# Patient Record
Sex: FEMALE | Race: OTHER | HISPANIC OR LATINO | URBAN - METROPOLITAN AREA
[De-identification: names, ages, dates, MRNs, and addresses within clinical notes are randomized per-mention and may not be internally consistent; named-entity substitution may affect disease eponyms.]

---

## 2019-03-01 ENCOUNTER — OUTPATIENT (OUTPATIENT)
Dept: OUTPATIENT SERVICES | Facility: HOSPITAL | Age: 59
LOS: 1 days | End: 2019-03-01
Payer: COMMERCIAL

## 2019-03-23 ENCOUNTER — INPATIENT (INPATIENT)
Facility: HOSPITAL | Age: 59
LOS: 1 days | Discharge: HOME | End: 2019-03-25
Attending: INTERNAL MEDICINE | Admitting: INTERNAL MEDICINE
Payer: MEDICAID

## 2019-03-23 VITALS
HEART RATE: 65 BPM | SYSTOLIC BLOOD PRESSURE: 122 MMHG | TEMPERATURE: 98 F | DIASTOLIC BLOOD PRESSURE: 68 MMHG | RESPIRATION RATE: 16 BRPM | OXYGEN SATURATION: 98 %

## 2019-03-23 DIAGNOSIS — R07.9 CHEST PAIN, UNSPECIFIED: ICD-10-CM

## 2019-03-23 LAB
ALBUMIN SERPL ELPH-MCNC: 5 G/DL — SIGNIFICANT CHANGE UP (ref 3.3–5)
ALP SERPL-CCNC: 82 U/L — SIGNIFICANT CHANGE UP (ref 40–120)
ALT FLD-CCNC: 31 U/L — SIGNIFICANT CHANGE UP (ref 4–33)
ANION GAP SERPL CALC-SCNC: 19 MMO/L — HIGH (ref 7–14)
APTT BLD: 33.3 SEC — SIGNIFICANT CHANGE UP (ref 27.5–36.3)
AST SERPL-CCNC: 22 U/L — SIGNIFICANT CHANGE UP (ref 4–32)
BASOPHILS # BLD AUTO: 0.06 K/UL — SIGNIFICANT CHANGE UP (ref 0–0.2)
BASOPHILS NFR BLD AUTO: 0.4 % — SIGNIFICANT CHANGE UP (ref 0–2)
BILIRUB SERPL-MCNC: 0.4 MG/DL — SIGNIFICANT CHANGE UP (ref 0.2–1.2)
BUN SERPL-MCNC: 40 MG/DL — HIGH (ref 7–23)
CALCIUM SERPL-MCNC: 10.3 MG/DL — SIGNIFICANT CHANGE UP (ref 8.4–10.5)
CHLORIDE SERPL-SCNC: 95 MMOL/L — LOW (ref 98–107)
CO2 SERPL-SCNC: 22 MMOL/L — SIGNIFICANT CHANGE UP (ref 22–31)
CREAT SERPL-MCNC: 1.35 MG/DL — HIGH (ref 0.5–1.3)
EOSINOPHIL # BLD AUTO: 0.13 K/UL — SIGNIFICANT CHANGE UP (ref 0–0.5)
EOSINOPHIL NFR BLD AUTO: 0.9 % — SIGNIFICANT CHANGE UP (ref 0–6)
GLUCOSE SERPL-MCNC: 114 MG/DL — HIGH (ref 70–99)
HCT VFR BLD CALC: 44 % — SIGNIFICANT CHANGE UP (ref 34.5–45)
HGB BLD-MCNC: 14.6 G/DL — SIGNIFICANT CHANGE UP (ref 11.5–15.5)
IMM GRANULOCYTES NFR BLD AUTO: 0.4 % — SIGNIFICANT CHANGE UP (ref 0–1.5)
INR BLD: 1 — SIGNIFICANT CHANGE UP (ref 0.88–1.17)
LYMPHOCYTES # BLD AUTO: 23.2 % — SIGNIFICANT CHANGE UP (ref 13–44)
LYMPHOCYTES # BLD AUTO: 3.25 K/UL — SIGNIFICANT CHANGE UP (ref 1–3.3)
MCHC RBC-ENTMCNC: 29.9 PG — SIGNIFICANT CHANGE UP (ref 27–34)
MCHC RBC-ENTMCNC: 33.2 % — SIGNIFICANT CHANGE UP (ref 32–36)
MCV RBC AUTO: 90.2 FL — SIGNIFICANT CHANGE UP (ref 80–100)
MONOCYTES # BLD AUTO: 1.05 K/UL — HIGH (ref 0–0.9)
MONOCYTES NFR BLD AUTO: 7.5 % — SIGNIFICANT CHANGE UP (ref 2–14)
NEUTROPHILS # BLD AUTO: 9.44 K/UL — HIGH (ref 1.8–7.4)
NEUTROPHILS NFR BLD AUTO: 67.6 % — SIGNIFICANT CHANGE UP (ref 43–77)
NRBC # FLD: 0 K/UL — LOW (ref 25–125)
NT-PROBNP SERPL-SCNC: 20.84 PG/ML — SIGNIFICANT CHANGE UP
PLATELET # BLD AUTO: 398 K/UL — SIGNIFICANT CHANGE UP (ref 150–400)
PMV BLD: 10.3 FL — SIGNIFICANT CHANGE UP (ref 7–13)
POTASSIUM SERPL-MCNC: 3.9 MMOL/L — SIGNIFICANT CHANGE UP (ref 3.5–5.3)
POTASSIUM SERPL-SCNC: 3.9 MMOL/L — SIGNIFICANT CHANGE UP (ref 3.5–5.3)
PROT SERPL-MCNC: 8.6 G/DL — HIGH (ref 6–8.3)
PROTHROM AB SERPL-ACNC: 11.1 SEC — SIGNIFICANT CHANGE UP (ref 9.8–13.1)
RBC # BLD: 4.88 M/UL — SIGNIFICANT CHANGE UP (ref 3.8–5.2)
RBC # FLD: 12.4 % — SIGNIFICANT CHANGE UP (ref 10.3–14.5)
SODIUM SERPL-SCNC: 136 MMOL/L — SIGNIFICANT CHANGE UP (ref 135–145)
TROPONIN T, HIGH SENSITIVITY: 10 NG/L — SIGNIFICANT CHANGE UP (ref ?–14)
TROPONIN T, HIGH SENSITIVITY: 9 NG/L — SIGNIFICANT CHANGE UP (ref ?–14)
WBC # BLD: 13.98 K/UL — HIGH (ref 3.8–10.5)
WBC # FLD AUTO: 13.98 K/UL — HIGH (ref 3.8–10.5)

## 2019-03-23 PROCEDURE — 71045 X-RAY EXAM CHEST 1 VIEW: CPT | Mod: 26,59

## 2019-03-23 PROCEDURE — 71046 X-RAY EXAM CHEST 2 VIEWS: CPT | Mod: 26

## 2019-03-23 RX ORDER — SODIUM CHLORIDE 9 MG/ML
1000 INJECTION INTRAMUSCULAR; INTRAVENOUS; SUBCUTANEOUS ONCE
Qty: 0 | Refills: 0 | Status: COMPLETED | OUTPATIENT
Start: 2019-03-23 | End: 2019-03-23

## 2019-03-23 RX ORDER — ACETAMINOPHEN 500 MG
975 TABLET ORAL ONCE
Qty: 0 | Refills: 0 | Status: COMPLETED | OUTPATIENT
Start: 2019-03-23 | End: 2019-03-23

## 2019-03-23 RX ORDER — NITROGLYCERIN 6.5 MG
0.4 CAPSULE, EXTENDED RELEASE ORAL ONCE
Qty: 0 | Refills: 0 | Status: COMPLETED | OUTPATIENT
Start: 2019-03-23 | End: 2019-03-23

## 2019-03-23 RX ORDER — ASPIRIN/CALCIUM CARB/MAGNESIUM 324 MG
162 TABLET ORAL ONCE
Qty: 0 | Refills: 0 | Status: COMPLETED | OUTPATIENT
Start: 2019-03-23 | End: 2019-03-23

## 2019-03-23 RX ADMIN — SODIUM CHLORIDE 1000 MILLILITER(S): 9 INJECTION INTRAMUSCULAR; INTRAVENOUS; SUBCUTANEOUS at 18:54

## 2019-03-23 RX ADMIN — Medication 0.4 MILLIGRAM(S): at 19:17

## 2019-03-23 RX ADMIN — Medication 975 MILLIGRAM(S): at 17:59

## 2019-03-23 RX ADMIN — Medication 162 MILLIGRAM(S): at 17:58

## 2019-03-23 NOTE — ED ADULT NURSE NOTE - OBJECTIVE STATEMENT
Pt presenting to room 28 with c/o dizziness, SOB, CP, N/V. Pt states she has been having CP x5 months, denies any significant cardiac history. Pt states over the passed week CP has gotten worse, and has had SOB, N/V. Pt states SOB occurs at random times, both with sitting and ambulating, pt states she thinks it is related to anxiety. Pt presenting to room 28 with c/o dizziness, SOB, left sided CP, N/V. Pt states she has been having CP x5 months, denies any significant cardiac history. Pt states over the passed week CP has gotten worse, and has had SOB, N/V. Pt states SOB occurs at random times, both with sitting and ambulating, pt states she thinks it is related to anxiety. Pt denies any significant cardiac history, PMH of HTN. Pt ambulatory at baseline, motor strength equal Pt presenting to room 28 with c/o dizziness, SOB, left sided CP, N/V. Pt states she has been having CP x5 months, denies any significant cardiac history. Pt states over the passed week CP has gotten worse, and has had SOB, N/V. Pt states SOB occurs at random times, both with sitting and ambulating, pt states she thinks it is related to anxiety. Pt also states she feels as if the room is spinning when she is dizzy. Son at bedside states she has had multiple episodes of near syncope- she denies loosing consciousness or falling. Pt denies any significant cardiac history, PMH of HTN. Pt ambulatory at baseline. Pt denies urinary symptoms, confusion, loss of appetite, H/A, fever, chills. Abdomen soft and nondistended, skin dry and intact. IV established in RAC with 20g, labs drawn and sent, MD at bedside, will continue to monitor.

## 2019-03-23 NOTE — ED PROVIDER NOTE - OBJECTIVE STATEMENT
57 yo female with PMH of HTN, DM, presents to the ED for chest pain and near syncopal episode. Pt has had intermittent CP at rest for several weeks, worsening over past week and today noted to have near syncopal episode at home while watching TV on cough witnessed by family today. In ED, pt c/o active pressure like chest pain and back pain but denies ripping or tearing pain. Congruent BP's in b/l upper extremities, denies paresthesia, denies abd pain, n/v, diarrhea, fever, chills, trauma. Son who pt requested translate (declined translation services offered by ED), states they were all watching TV when she starting making an odd crying noise and slumped over but states she did not fully lose consciousness.

## 2019-03-23 NOTE — ED ADULT TRIAGE NOTE - CHIEF COMPLAINT QUOTE
patient c/o left chest and left arm pain x 5 months ,getting worse recently.  C/o feeling dizzy and was going to pass out today. FS = 153

## 2019-03-23 NOTE — ED PROVIDER NOTE - ATTENDING CONTRIBUTION TO CARE
Lujan: 58 yof with intermittent cp for few weeks, at initial eval by myself 9/10, earlier today had a near syncopal episode. PT states sxs started at rest, also with back apin and sob. Pain decreased significantly with ntg. On exam, BP 130s in both arms, clear lungs, cardiac normal, abd soft and non tender, no edema, neuro exam non focal. EKG bradycardia with no changes between 1st and 2nd, labs, CXR.

## 2019-03-24 DIAGNOSIS — E03.9 HYPOTHYROIDISM, UNSPECIFIED: ICD-10-CM

## 2019-03-24 DIAGNOSIS — Z29.9 ENCOUNTER FOR PROPHYLACTIC MEASURES, UNSPECIFIED: ICD-10-CM

## 2019-03-24 DIAGNOSIS — E11.65 TYPE 2 DIABETES MELLITUS WITH HYPERGLYCEMIA: ICD-10-CM

## 2019-03-24 DIAGNOSIS — N28.9 DISORDER OF KIDNEY AND URETER, UNSPECIFIED: ICD-10-CM

## 2019-03-24 DIAGNOSIS — I10 ESSENTIAL (PRIMARY) HYPERTENSION: ICD-10-CM

## 2019-03-24 DIAGNOSIS — I24.9 ACUTE ISCHEMIC HEART DISEASE, UNSPECIFIED: ICD-10-CM

## 2019-03-24 LAB
ANION GAP SERPL CALC-SCNC: 15 MMO/L — HIGH (ref 7–14)
BUN SERPL-MCNC: 28 MG/DL — HIGH (ref 7–23)
CALCIUM SERPL-MCNC: 9.5 MG/DL — SIGNIFICANT CHANGE UP (ref 8.4–10.5)
CHLORIDE SERPL-SCNC: 99 MMOL/L — SIGNIFICANT CHANGE UP (ref 98–107)
CHOLEST SERPL-MCNC: 178 MG/DL — SIGNIFICANT CHANGE UP (ref 120–199)
CO2 SERPL-SCNC: 25 MMOL/L — SIGNIFICANT CHANGE UP (ref 22–31)
CREAT SERPL-MCNC: 0.82 MG/DL — SIGNIFICANT CHANGE UP (ref 0.5–1.3)
GLUCOSE SERPL-MCNC: 151 MG/DL — HIGH (ref 70–99)
HBA1C BLD-MCNC: 7.8 % — HIGH (ref 4–5.6)
HCT VFR BLD CALC: 41.9 % — SIGNIFICANT CHANGE UP (ref 34.5–45)
HCV AB S/CO SERPL IA: 0.08 S/CO — SIGNIFICANT CHANGE UP (ref 0–0.79)
HCV AB SERPL-IMP: SIGNIFICANT CHANGE UP
HDLC SERPL-MCNC: 44 MG/DL — LOW (ref 45–65)
HGB BLD-MCNC: 13.8 G/DL — SIGNIFICANT CHANGE UP (ref 11.5–15.5)
LIPID PNL WITH DIRECT LDL SERPL: 110 MG/DL — SIGNIFICANT CHANGE UP
MAGNESIUM SERPL-MCNC: 2.4 MG/DL — SIGNIFICANT CHANGE UP (ref 1.6–2.6)
MCHC RBC-ENTMCNC: 29.9 PG — SIGNIFICANT CHANGE UP (ref 27–34)
MCHC RBC-ENTMCNC: 32.9 % — SIGNIFICANT CHANGE UP (ref 32–36)
MCV RBC AUTO: 90.9 FL — SIGNIFICANT CHANGE UP (ref 80–100)
NRBC # FLD: 0 K/UL — LOW (ref 25–125)
PHOSPHATE SERPL-MCNC: 4.4 MG/DL — SIGNIFICANT CHANGE UP (ref 2.5–4.5)
PLATELET # BLD AUTO: 322 K/UL — SIGNIFICANT CHANGE UP (ref 150–400)
PMV BLD: 10.2 FL — SIGNIFICANT CHANGE UP (ref 7–13)
POTASSIUM SERPL-MCNC: 3.8 MMOL/L — SIGNIFICANT CHANGE UP (ref 3.5–5.3)
POTASSIUM SERPL-SCNC: 3.8 MMOL/L — SIGNIFICANT CHANGE UP (ref 3.5–5.3)
RBC # BLD: 4.61 M/UL — SIGNIFICANT CHANGE UP (ref 3.8–5.2)
RBC # FLD: 12.6 % — SIGNIFICANT CHANGE UP (ref 10.3–14.5)
SODIUM SERPL-SCNC: 139 MMOL/L — SIGNIFICANT CHANGE UP (ref 135–145)
TRIGL SERPL-MCNC: 249 MG/DL — HIGH (ref 10–149)
TSH SERPL-MCNC: 0.84 UIU/ML — SIGNIFICANT CHANGE UP (ref 0.27–4.2)
WBC # BLD: 8.27 K/UL — SIGNIFICANT CHANGE UP (ref 3.8–10.5)
WBC # FLD AUTO: 8.27 K/UL — SIGNIFICANT CHANGE UP (ref 3.8–10.5)

## 2019-03-24 PROCEDURE — 93306 TTE W/DOPPLER COMPLETE: CPT | Mod: 26

## 2019-03-24 RX ORDER — DEXTROSE 50 % IN WATER 50 %
25 SYRINGE (ML) INTRAVENOUS ONCE
Qty: 0 | Refills: 0 | Status: DISCONTINUED | OUTPATIENT
Start: 2019-03-24 | End: 2019-03-25

## 2019-03-24 RX ORDER — GLUCAGON INJECTION, SOLUTION 0.5 MG/.1ML
1 INJECTION, SOLUTION SUBCUTANEOUS ONCE
Qty: 0 | Refills: 0 | Status: DISCONTINUED | OUTPATIENT
Start: 2019-03-24 | End: 2019-03-25

## 2019-03-24 RX ORDER — INSULIN LISPRO 100/ML
VIAL (ML) SUBCUTANEOUS
Qty: 0 | Refills: 0 | Status: DISCONTINUED | OUTPATIENT
Start: 2019-03-24 | End: 2019-03-25

## 2019-03-24 RX ORDER — ASPIRIN/CALCIUM CARB/MAGNESIUM 324 MG
81 TABLET ORAL DAILY
Qty: 0 | Refills: 0 | Status: DISCONTINUED | OUTPATIENT
Start: 2019-03-24 | End: 2019-03-25

## 2019-03-24 RX ORDER — DEXTROSE 50 % IN WATER 50 %
12.5 SYRINGE (ML) INTRAVENOUS ONCE
Qty: 0 | Refills: 0 | Status: DISCONTINUED | OUTPATIENT
Start: 2019-03-24 | End: 2019-03-25

## 2019-03-24 RX ORDER — PANTOPRAZOLE SODIUM 20 MG/1
40 TABLET, DELAYED RELEASE ORAL
Qty: 0 | Refills: 0 | Status: DISCONTINUED | OUTPATIENT
Start: 2019-03-24 | End: 2019-03-25

## 2019-03-24 RX ORDER — HEPARIN SODIUM 5000 [USP'U]/ML
5000 INJECTION INTRAVENOUS; SUBCUTANEOUS EVERY 12 HOURS
Qty: 0 | Refills: 0 | Status: DISCONTINUED | OUTPATIENT
Start: 2019-03-24 | End: 2019-03-25

## 2019-03-24 RX ORDER — LEVOTHYROXINE SODIUM 125 MCG
150 TABLET ORAL DAILY
Qty: 0 | Refills: 0 | Status: DISCONTINUED | OUTPATIENT
Start: 2019-03-24 | End: 2019-03-25

## 2019-03-24 RX ORDER — SODIUM CHLORIDE 9 MG/ML
1000 INJECTION, SOLUTION INTRAVENOUS
Qty: 0 | Refills: 0 | Status: DISCONTINUED | OUTPATIENT
Start: 2019-03-24 | End: 2019-03-25

## 2019-03-24 RX ORDER — INSULIN LISPRO 100/ML
VIAL (ML) SUBCUTANEOUS AT BEDTIME
Qty: 0 | Refills: 0 | Status: DISCONTINUED | OUTPATIENT
Start: 2019-03-24 | End: 2019-03-25

## 2019-03-24 RX ORDER — DEXTROSE 50 % IN WATER 50 %
15 SYRINGE (ML) INTRAVENOUS ONCE
Qty: 0 | Refills: 0 | Status: DISCONTINUED | OUTPATIENT
Start: 2019-03-24 | End: 2019-03-25

## 2019-03-24 RX ADMIN — PANTOPRAZOLE SODIUM 40 MILLIGRAM(S): 20 TABLET, DELAYED RELEASE ORAL at 05:12

## 2019-03-24 RX ADMIN — Medication 150 MICROGRAM(S): at 05:12

## 2019-03-24 RX ADMIN — HEPARIN SODIUM 5000 UNIT(S): 5000 INJECTION INTRAVENOUS; SUBCUTANEOUS at 05:12

## 2019-03-24 RX ADMIN — Medication 81 MILLIGRAM(S): at 11:08

## 2019-03-24 RX ADMIN — HEPARIN SODIUM 5000 UNIT(S): 5000 INJECTION INTRAVENOUS; SUBCUTANEOUS at 17:16

## 2019-03-24 NOTE — H&P ADULT - PROBLEM SELECTOR PLAN 2
BUN/Cr: 40/1.35 likely 2/2 to underline DM vs HTN  Will monitor for now   Renal diet ordered   Avoid nephrotoxic medications

## 2019-03-24 NOTE — H&P ADULT - HISTORY OF PRESENT ILLNESS
57 y/o F with PMH of DM, HTN, Hypothyroidism presented with the complaint of chest pain and near syncopal episode. As per daughter in law who is it at bedside patient has been having intermittent midsternal chest pain for the past 5 months but worsened the past few days. Patient would describe the chest pain as a sharp pain, without any aggravating or alleviating factors(present both at rest and with exertion), with radiation of the chest pain to the left arm and left side of the neck, with a severity of 9/10. Patient also endorsed of SOB and ELIZABETH with the chest pain with associated diaphoresis. Son stated that his mother had nausea with the chest pain with one episode of NBNB vomiting prior to coming to the ER. Son stated that today after the chest pain she developed dizziness and lightheadedness and felt like she was about to pass out.Son denied any LOC, head trauma or any seizure like activities. Son denied the following for his mother: Fevers, chills, D/C, abdominal pain, dysuria, melena, hematochezia, recent travel, sick contact, pleuritic or positional chest pain.     On ED admission EKG revealed Sinus bradycardia at a rate of 50 with 1mm STD in leads V2-V4 and QTc of 432, CE x 1: Trop: 10, CE x 2: Trop: 9, WBC: 13.98, BUN/Cr: 40/1.35, Gluc: 114, Protein: 8.6, BNP: 20.84. Prelim CXR: No emergent findings. In the ED patient received Aspirin 162mg and Nitroglycerine 0.4mg. When examined patient is resting in the stretcher and denied any current chest pain or SOB.

## 2019-03-24 NOTE — H&P ADULT - GASTROINTESTINAL DETAILS
normal/no guarding/nontender/no distention/bowel sounds normal/soft/no rebound tenderness/no rigidity

## 2019-03-24 NOTE — H&P ADULT - RS GEN PE MLT RESP DETAILS PC
no rhonchi/no intercostal retractions/no chest wall tenderness/respirations non-labored/airway patent/rales/normal/no wheezes

## 2019-03-24 NOTE — H&P ADULT - NEGATIVE OPHTHALMOLOGIC SYMPTOMS
no blurred vision R/no discharge R/no blurred vision L/no lacrimation R/no photophobia/no diplopia/no lacrimation L/no discharge L

## 2019-03-24 NOTE — H&P ADULT - ASSESSMENT
57 y/o F with PMH of DM, HTN, Hypothyroidism presented with the complaint of chest pain and near syncopal episode. R/o ACS

## 2019-03-24 NOTE — H&P ADULT - NEGATIVE NEUROLOGICAL SYMPTOMS
no syncope/no headache/no transient paralysis/no loss of consciousness/no hemiparesis/no confusion/no focal seizures/no paresthesias/no generalized seizures/no vertigo/no loss of sensation/no difficulty walking/no tremors

## 2019-03-24 NOTE — H&P ADULT - NEGATIVE MUSCULOSKELETAL SYMPTOMS
no arthralgia/no stiffness/no neck pain/no myalgia/no arthritis/no muscle cramps/no muscle weakness/no joint swelling

## 2019-03-24 NOTE — H&P ADULT - PROBLEM SELECTOR PLAN 4
Will hold home losartan and chlorthalidone due to elevated creatinine  Will monitor blood pressure with vital signs q4hrs   DASH diet recommended

## 2019-03-24 NOTE — H&P ADULT - NSHPLABSRESULTS_GEN_ALL_CORE
14.6   13.98 )-----------( 398      ( 23 Mar 2019 17:50 )             44.0     03-23    136  |  95<L>  |  40<H>  ----------------------------<  114<H>  3.9   |  22  |  1.35<H>    Ca    10.3      23 Mar 2019 17:50    TPro  8.6<H>  /  Alb  5.0  /  TBili  0.4  /  DBili  x   /  AST  22  /  ALT  31  /  AlkPhos  82  03-23    Prelim CXR: No emergent findings    EKG: Sinus bradycardia at a rate of 50 with 1mm STD in leads V2-V4 and QTc of 432

## 2019-03-24 NOTE — H&P ADULT - PROBLEM SELECTOR PLAN 1
Will monitor on telemetry, serial EKG and Mindy prn for any more episodes of chest pain   HgbA1C, TSH, lipid profile, CBC, CMP in am   Consider ischemic workup with NST this admission. Patient noted to be bradycardic will check for chronotropic competence  TTE ordered   Started on Aspirin 81mg daily

## 2019-03-25 ENCOUNTER — TRANSCRIPTION ENCOUNTER (OUTPATIENT)
Age: 59
End: 2019-03-25

## 2019-03-25 VITALS — SYSTOLIC BLOOD PRESSURE: 146 MMHG | HEART RATE: 64 BPM | DIASTOLIC BLOOD PRESSURE: 78 MMHG

## 2019-03-25 LAB
ANION GAP SERPL CALC-SCNC: 13 MMO/L — SIGNIFICANT CHANGE UP (ref 7–14)
BUN SERPL-MCNC: 25 MG/DL — HIGH (ref 7–23)
CALCIUM SERPL-MCNC: 9.8 MG/DL — SIGNIFICANT CHANGE UP (ref 8.4–10.5)
CHLORIDE SERPL-SCNC: 98 MMOL/L — SIGNIFICANT CHANGE UP (ref 98–107)
CO2 SERPL-SCNC: 27 MMOL/L — SIGNIFICANT CHANGE UP (ref 22–31)
CREAT SERPL-MCNC: 0.72 MG/DL — SIGNIFICANT CHANGE UP (ref 0.5–1.3)
GLUCOSE SERPL-MCNC: 173 MG/DL — HIGH (ref 70–99)
HCT VFR BLD CALC: 44.7 % — SIGNIFICANT CHANGE UP (ref 34.5–45)
HGB BLD-MCNC: 14.3 G/DL — SIGNIFICANT CHANGE UP (ref 11.5–15.5)
MCHC RBC-ENTMCNC: 29.9 PG — SIGNIFICANT CHANGE UP (ref 27–34)
MCHC RBC-ENTMCNC: 32 % — SIGNIFICANT CHANGE UP (ref 32–36)
MCV RBC AUTO: 93.5 FL — SIGNIFICANT CHANGE UP (ref 80–100)
NRBC # FLD: 0 K/UL — LOW (ref 25–125)
PLATELET # BLD AUTO: 334 K/UL — SIGNIFICANT CHANGE UP (ref 150–400)
PMV BLD: 10.3 FL — SIGNIFICANT CHANGE UP (ref 7–13)
POTASSIUM SERPL-MCNC: 3.4 MMOL/L — LOW (ref 3.5–5.3)
POTASSIUM SERPL-SCNC: 3.4 MMOL/L — LOW (ref 3.5–5.3)
RBC # BLD: 4.78 M/UL — SIGNIFICANT CHANGE UP (ref 3.8–5.2)
RBC # FLD: 12.2 % — SIGNIFICANT CHANGE UP (ref 10.3–14.5)
SODIUM SERPL-SCNC: 138 MMOL/L — SIGNIFICANT CHANGE UP (ref 135–145)
WBC # BLD: 6.39 K/UL — SIGNIFICANT CHANGE UP (ref 3.8–10.5)
WBC # FLD AUTO: 6.39 K/UL — SIGNIFICANT CHANGE UP (ref 3.8–10.5)

## 2019-03-25 PROCEDURE — 78452 HT MUSCLE IMAGE SPECT MULT: CPT | Mod: 26

## 2019-03-25 PROCEDURE — 93016 CV STRESS TEST SUPVJ ONLY: CPT | Mod: GC

## 2019-03-25 PROCEDURE — 93018 CV STRESS TEST I&R ONLY: CPT | Mod: GC

## 2019-03-25 RX ORDER — CHLORTHALIDONE 50 MG
1 TABLET ORAL
Qty: 0 | Refills: 0 | COMMUNITY

## 2019-03-25 RX ORDER — ASPIRIN/CALCIUM CARB/MAGNESIUM 324 MG
1 TABLET ORAL
Qty: 0 | Refills: 0 | COMMUNITY
Start: 2019-03-25

## 2019-03-25 RX ORDER — METFORMIN HYDROCHLORIDE 850 MG/1
1 TABLET ORAL
Qty: 0 | Refills: 0 | COMMUNITY

## 2019-03-25 RX ORDER — OMEPRAZOLE 10 MG/1
1 CAPSULE, DELAYED RELEASE ORAL
Qty: 0 | Refills: 0 | COMMUNITY

## 2019-03-25 RX ORDER — LOSARTAN POTASSIUM 100 MG/1
1 TABLET, FILM COATED ORAL
Qty: 0 | Refills: 0 | COMMUNITY

## 2019-03-25 RX ORDER — LEVOTHYROXINE SODIUM 125 MCG
1 TABLET ORAL
Qty: 0 | Refills: 0 | COMMUNITY

## 2019-03-25 RX ORDER — POTASSIUM CHLORIDE 20 MEQ
40 PACKET (EA) ORAL ONCE
Qty: 0 | Refills: 0 | Status: COMPLETED | OUTPATIENT
Start: 2019-03-25 | End: 2019-03-25

## 2019-03-25 RX ADMIN — Medication 40 MILLIEQUIVALENT(S): at 11:47

## 2019-03-25 RX ADMIN — HEPARIN SODIUM 5000 UNIT(S): 5000 INJECTION INTRAVENOUS; SUBCUTANEOUS at 06:39

## 2019-03-25 RX ADMIN — Medication 81 MILLIGRAM(S): at 11:47

## 2019-03-25 RX ADMIN — Medication 150 MICROGRAM(S): at 06:39

## 2019-03-25 RX ADMIN — PANTOPRAZOLE SODIUM 40 MILLIGRAM(S): 20 TABLET, DELAYED RELEASE ORAL at 06:38

## 2019-03-25 NOTE — DISCHARGE NOTE PROVIDER - HOSPITAL COURSE
59 y/o F with PMH of DM, HTN, Hypothyroidism presented with the complaint of chest pain and near syncopal episode. As per daughter in law who is it at bedside patient has been having intermittent midsternal chest pain for the past 5 months but worsened the past few days. Patient would describe the chest pain as a sharp pain, without any aggravating or alleviating factors(present both at rest and with exertion), with radiation of the chest pain to the left arm and left side of the neck, with a severity of 9/10. Patient also endorsed of SOB and ELIZABETH with the chest pain with associated diaphoresis. Son stated that his mother had nausea with the chest pain with one episode of NBNB vomiting prior to coming to the ER. Son stated that today after the chest pain she developed dizziness and lightheadedness and felt like she was about to pass out.Son denied any LOC, head trauma or any seizure like activities. Son denied the following for his mother: Fevers, chills, D/C, abdominal pain, dysuria, melena, hematochezia, recent travel, sick contact, pleuritic or positional chest pain.         On ED admission EKG revealed Sinus bradycardia at a rate of 50 with 1mm STD in leads V2-V4 and QTc of 432, CE x 1: Trop: 10, CE x 2: Trop: 9, WBC: 13.98, BUN/Cr: 40/1.35, Gluc: 114, Protein: 8.6, BNP: 20.84. Prelim CXR: No emergent findings. In the ED patient received Aspirin 162mg and Nitroglycerine 0.4mg. When examined patient is resting in the stretcher and denied any current chest pain or SOB. 57 y/o F with PMH of DM, HTN, Hypothyroidism presented with the complaint of chest pain and near syncopal episode. As per daughter in law who is it at bedside patient has been having intermittent midsternal chest pain for the past 5 months but worsened the past few days. Patient would describe the chest pain as a sharp pain, without any aggravating or alleviating factors(present both at rest and with exertion), with radiation of the chest pain to the left arm and left side of the neck, with a severity of 9/10. Patient also endorsed of SOB and ELIZABETH with the chest pain with associated diaphoresis. Son stated that his mother had nausea with the chest pain with one episode of NBNB vomiting prior to coming to the ER. Son stated that today after the chest pain she developed dizziness and lightheadedness and felt like she was about to pass out.Son denied any LOC, head trauma or any seizure like activities. Son denied the following for his mother: Fevers, chills, D/C, abdominal pain, dysuria, melena, hematochezia, recent travel, sick contact, pleuritic or positional chest pain.         On ED admission EKG revealed Sinus bradycardia at a rate of 50 with 1mm STD in leads V2-V4 and QTc of 432, CE x 1: Trop: 10, CE x 2: Trop: 9, WBC: 13.98, BUN/Cr: 40/1.35, Gluc: 114, Protein: 8.6, BNP: 20.84. CXR: Minimal left lung base linear or subsegmental atelectasis seen on the lateral x-ray. There is no evidence of pleural effusion or pneumothorax. The cardiomediastinal silhouette is unremarkable. The visualized osseous and soft tissue structures demonstrate no acute pathology. In the ED patient received Aspirin 162mg and Nitroglycerine 0.4mg. When examined patient is resting in the stretcher and denied any current chest pain or SOB.         NST Pharmacologic: Normal Study    * Myocardial Perfusion SPECT results are normal.    * Review of raw data shows: The study is of good technical quality, despite overlying breast attenuation.     * The left ventricle was small in size. Normal myocardial perfusion scan,with no evidence of infarction or inducible ischemia.    * Post-stress gated wall motion analysis was performed (LVEF > 70%;LVEDV = 52 ml.), revealing normal LV function. RV function appeared normal.        Cardiology attending: Patient has ruled out for ACS. EKG is sinus bradycardia with non-specific t-wave changes. Given risk factors and abnormal EKG will plan for exercise NST. TTE with no significant structural heart disease. Discharge planning pending NST.     Case reviewed with attending who cleared for discharge. 59 y/o F with PMH of DM, HTN, Hypothyroidism presented with the complaint of chest pain and near syncopal episode. As per daughter in law who is it at bedside patient has been having intermittent midsternal chest pain for the past 5 months but worsened the past few days. Patient would describe the chest pain as a sharp pain, without any aggravating or alleviating factors(present both at rest and with exertion), with radiation of the chest pain to the left arm and left side of the neck, with a severity of 9/10. Patient also endorsed of SOB and ELIZABETH with the chest pain with associated diaphoresis. Son stated that his mother had nausea with the chest pain with one episode of NBNB vomiting prior to coming to the ER. Son stated that today after the chest pain she developed dizziness and lightheadedness and felt like she was about to pass out.Son denied any LOC, head trauma or any seizure like activities. Son denied the following for his mother: Fevers, chills, D/C, abdominal pain, dysuria, melena, hematochezia, recent travel, sick contact, pleuritic or positional chest pain.         On ED admission EKG revealed Sinus bradycardia at a rate of 50 with 1mm STD in leads V2-V4 and QTc of 432, CE x 1: Trop: 10, CE x 2: Trop: 9, WBC: 13.98, BUN/Cr: 40/1.35, Gluc: 114, Protein: 8.6, BNP: 20.84. CXR: Minimal left lung base linear or subsegmental atelectasis seen on the lateral x-ray. There is no evidence of pleural effusion or pneumothorax. The cardiomediastinal silhouette is unremarkable. The visualized osseous and soft tissue structures demonstrate no acute pathology. In the ED patient received Aspirin 162mg and Nitroglycerine 0.4mg. When examined patient is resting in the stretcher and denied any current chest pain or SOB.         NST Pharmacologic: Normal Study    * Myocardial Perfusion SPECT results are normal.    * Review of raw data shows: The study is of good technical quality, despite overlying breast attenuation.     * The left ventricle was small in size. Normal myocardial perfusion scan,with no evidence of infarction or inducible ischemia.    * Post-stress gated wall motion analysis was performed (LVEF > 70%;LVEDV = 52 ml.), revealing normal LV function. RV function appeared normal.        Cardiology attending: Patient has ruled out for ACS. EKG is sinus bradycardia with non-specific t-wave changes. Given risk factors and abnormal EKG will plan for exercise NST. TTE with no significant structural heart disease. Discharge planning pending NST.     Case reviewed with attending who cleared for discharge after negative stress test.

## 2019-03-25 NOTE — DISCHARGE NOTE NURSING/CASE MANAGEMENT/SOCIAL WORK - NSDCDPATPORTLINK_GEN_ALL_CORE
You can access the [facility], offered by [facility], by registering with the following website: [email or web address]

## 2019-03-25 NOTE — DISCHARGE NOTE PROVIDER - CARE PROVIDER_API CALL
Eric Dee)  Internal Medicine  [address] [place] [address]  Phone: [phone]  Fax: [phone]  Follow Up Time: 1 week

## 2019-03-25 NOTE — PROGRESS NOTE ADULT - SUBJECTIVE AND OBJECTIVE BOX
Cardiovascular Disease Progress Note    Overnight events: No acute events overnight. Ms. Hull denies chest pain or SOB.    Otherwise review of systems negative    Objective Findings:  T(C): 36.7 (03-25-19 @ 05:35), Max: 37.2 (03-24-19 @ 10:52)  HR: 62 (03-25-19 @ 05:35) (60 - 65)  BP: 121/69 (03-25-19 @ 05:35) (121/69 - 139/81)  RR: 18 (03-25-19 @ 05:35) (17 - 18)  SpO2: 99% (03-25-19 @ 05:35) (95% - 100%)  Wt(kg): --  Daily     Daily       Physical Exam:  Gen: NAD  HEENT: EOMI  CV: RRR, normal S1 + S2, no m/r/g  Lungs: CTAB  Abd: soft, non-tender  Ext: No edema    Telemetry: Sinus    Laboratory Data:                        13.8   8.27  )-----------( 322      ( 24 Mar 2019 06:33 )             41.9     03-25    138  |  98  |  25<H>  ----------------------------<  173<H>  3.4<L>   |  27  |  0.72    Ca    9.8      25 Mar 2019 07:10  Phos  4.4     03-24  Mg     2.4     03-24    TPro  8.6<H>  /  Alb  5.0  /  TBili  0.4  /  DBili  x   /  AST  22  /  ALT  31  /  AlkPhos  82  03-23    PT/INR - ( 23 Mar 2019 17:50 )   PT: 11.1 SEC;   INR: 1.00          PTT - ( 23 Mar 2019 17:50 )  PTT:33.3 SEC          Inpatient Medications:  MEDICATIONS  (STANDING):  aspirin enteric coated 81 milliGRAM(s) Oral daily  dextrose 5%. 1000 milliLiter(s) (50 mL/Hr) IV Continuous <Continuous>  dextrose 50% Injectable 12.5 Gram(s) IV Push once  dextrose 50% Injectable 25 Gram(s) IV Push once  dextrose 50% Injectable 25 Gram(s) IV Push once  heparin  Injectable 5000 Unit(s) SubCutaneous every 12 hours  insulin lispro (HumaLOG) corrective regimen sliding scale   SubCutaneous three times a day before meals  insulin lispro (HumaLOG) corrective regimen sliding scale   SubCutaneous at bedtime  levothyroxine 150 MICROGram(s) Oral daily  pantoprazole    Tablet 40 milliGRAM(s) Oral before breakfast      Assessment: 58 year old woman with HTN and uncontrolled NIDDM (HbA1c -7.8%) presents with angina and abnormal EKG.    #Angina-  Patient has ruled out for ACS.  EKG is sinus bradycardia with non-specific t-wave changes.  Given risk factors and abnormal EKG will plan for TTE and exercise NST.    #HTN-  BP acceptable at present and orthostatics negative.  Please resume home dose BP meds.      Discharge planning pending TTE and NST.    Over 25 minutes spent on total encounter; more than 50% of the visit was spent counseling and/or coordinating care by the attending physician.      Eric Dee MD [facility]  Cardiovascular Disease  [phone] Cardiovascular Disease Progress Note    Overnight events: No acute events overnight. Ms. Hull denies chest pain or SOB.    Otherwise review of systems negative    Objective Findings:  T(C): 36.7 (03-25-19 @ 05:35), Max: 37.2 (03-24-19 @ 10:52)  HR: 62 (03-25-19 @ 05:35) (60 - 65)  BP: 121/69 (03-25-19 @ 05:35) (121/69 - 139/81)  RR: 18 (03-25-19 @ 05:35) (17 - 18)  SpO2: 99% (03-25-19 @ 05:35) (95% - 100%)  Wt(kg): --  Daily     Daily       Physical Exam:  Gen: NAD  HEENT: EOMI  CV: RRR, normal S1 + S2, no m/r/g  Lungs: CTAB  Abd: soft, non-tender  Ext: No edema    Telemetry: Sinus    Laboratory Data:                        13.8   8.27  )-----------( 322      ( 24 Mar 2019 06:33 )             41.9     03-25    138  |  98  |  25<H>  ----------------------------<  173<H>  3.4<L>   |  27  |  0.72    Ca    9.8      25 Mar 2019 07:10  Phos  4.4     03-24  Mg     2.4     03-24    TPro  8.6<H>  /  Alb  5.0  /  TBili  0.4  /  DBili  x   /  AST  22  /  ALT  31  /  AlkPhos  82  03-23    PT/INR - ( 23 Mar 2019 17:50 )   PT: 11.1 SEC;   INR: 1.00          PTT - ( 23 Mar 2019 17:50 )  PTT:33.3 SEC          Inpatient Medications:  MEDICATIONS  (STANDING):  aspirin enteric coated 81 milliGRAM(s) Oral daily  dextrose 5%. 1000 milliLiter(s) (50 mL/Hr) IV Continuous <Continuous>  dextrose 50% Injectable 12.5 Gram(s) IV Push once  dextrose 50% Injectable 25 Gram(s) IV Push once  dextrose 50% Injectable 25 Gram(s) IV Push once  heparin  Injectable 5000 Unit(s) SubCutaneous every 12 hours  insulin lispro (HumaLOG) corrective regimen sliding scale   SubCutaneous three times a day before meals  insulin lispro (HumaLOG) corrective regimen sliding scale   SubCutaneous at bedtime  levothyroxine 150 MICROGram(s) Oral daily  pantoprazole    Tablet 40 milliGRAM(s) Oral before breakfast      Assessment: 58 year old woman with HTN and uncontrolled NIDDM (HbA1c -7.8%) presents with angina and abnormal EKG.    #Angina-  Patient has ruled out for ACS.  EKG is sinus bradycardia with non-specific t-wave changes.  Given risk factors and abnormal EKG will plan for exercise NST.  TTE with no significant structural heart disease.     #HTN-  BP acceptable at present and orthostatics negative.  Please resume home dose BP meds.      Discharge planning pending NST.    Over 25 minutes spent on total encounter; more than 50% of the visit was spent counseling and/or coordinating care by the attending physician.      Eric Dee MD [facility]  Cardiovascular Disease  [phone]

## 2019-03-25 NOTE — DISCHARGE NOTE PROVIDER - NSDCCPCAREPLAN_GEN_ALL_CORE_FT
PRINCIPAL DISCHARGE DIAGNOSIS  Diagnosis: Chest pain  Assessment and Plan of Treatment: You presented to the hospital for chest pain and a near syncopal episode. No cardiac etiology for chest pain was found. Echocardiagram was normal and stress test was also found to be within normal limits. You are advised to follow up outpatient with your cardiologist in 1-2 weeks for outpatient follow up.

## 2020-06-15 PROCEDURE — G9001: CPT

## 2020-07-01 ENCOUNTER — EMERGENCY (EMERGENCY)
Facility: HOSPITAL | Age: 60
LOS: 1 days | Discharge: HOME | End: 2020-07-01
Attending: EMERGENCY MEDICINE | Admitting: EMERGENCY MEDICINE
Payer: MEDICAID

## 2020-07-01 VITALS
SYSTOLIC BLOOD PRESSURE: 157 MMHG | RESPIRATION RATE: 18 BRPM | TEMPERATURE: 98 F | OXYGEN SATURATION: 100 % | DIASTOLIC BLOOD PRESSURE: 73 MMHG | HEART RATE: 61 BPM

## 2020-07-01 VITALS
OXYGEN SATURATION: 97 % | DIASTOLIC BLOOD PRESSURE: 84 MMHG | RESPIRATION RATE: 15 BRPM | TEMPERATURE: 98 F | HEART RATE: 64 BPM | SYSTOLIC BLOOD PRESSURE: 200 MMHG

## 2020-07-01 PROBLEM — E03.9 HYPOTHYROIDISM, UNSPECIFIED: Chronic | Status: ACTIVE | Noted: 2019-03-24

## 2020-07-01 PROBLEM — I10 ESSENTIAL (PRIMARY) HYPERTENSION: Chronic | Status: ACTIVE | Noted: 2019-03-24

## 2020-07-01 PROBLEM — E11.9 TYPE 2 DIABETES MELLITUS WITHOUT COMPLICATIONS: Chronic | Status: ACTIVE | Noted: 2019-03-23

## 2020-07-01 LAB
ALBUMIN SERPL ELPH-MCNC: 5 G/DL — SIGNIFICANT CHANGE UP (ref 3.3–5)
ALP SERPL-CCNC: 72 U/L — SIGNIFICANT CHANGE UP (ref 40–120)
ALT FLD-CCNC: 30 U/L — SIGNIFICANT CHANGE UP (ref 4–33)
ANION GAP SERPL CALC-SCNC: 14 MMO/L — SIGNIFICANT CHANGE UP (ref 7–14)
APPEARANCE UR: CLEAR — SIGNIFICANT CHANGE UP
AST SERPL-CCNC: 28 U/L — SIGNIFICANT CHANGE UP (ref 4–32)
BASOPHILS # BLD AUTO: 0.03 K/UL — SIGNIFICANT CHANGE UP (ref 0–0.2)
BASOPHILS NFR BLD AUTO: 0.3 % — SIGNIFICANT CHANGE UP (ref 0–2)
BILIRUB SERPL-MCNC: 0.5 MG/DL — SIGNIFICANT CHANGE UP (ref 0.2–1.2)
BILIRUB UR-MCNC: NEGATIVE — SIGNIFICANT CHANGE UP
BLOOD UR QL VISUAL: SIGNIFICANT CHANGE UP
BUN SERPL-MCNC: 11 MG/DL — SIGNIFICANT CHANGE UP (ref 7–23)
CALCIUM SERPL-MCNC: 10.1 MG/DL — SIGNIFICANT CHANGE UP (ref 8.4–10.5)
CHLORIDE SERPL-SCNC: 100 MMOL/L — SIGNIFICANT CHANGE UP (ref 98–107)
CO2 SERPL-SCNC: 27 MMOL/L — SIGNIFICANT CHANGE UP (ref 22–31)
COLOR SPEC: COLORLESS — SIGNIFICANT CHANGE UP
CREAT SERPL-MCNC: 0.57 MG/DL — SIGNIFICANT CHANGE UP (ref 0.5–1.3)
EOSINOPHIL # BLD AUTO: 0.15 K/UL — SIGNIFICANT CHANGE UP (ref 0–0.5)
EOSINOPHIL NFR BLD AUTO: 1.7 % — SIGNIFICANT CHANGE UP (ref 0–6)
GLUCOSE SERPL-MCNC: 179 MG/DL — HIGH (ref 70–99)
GLUCOSE UR-MCNC: NEGATIVE — SIGNIFICANT CHANGE UP
HCG SERPL-ACNC: < 5 MIU/ML — SIGNIFICANT CHANGE UP
HCT VFR BLD CALC: 42.2 % — SIGNIFICANT CHANGE UP (ref 34.5–45)
HGB BLD-MCNC: 13.8 G/DL — SIGNIFICANT CHANGE UP (ref 11.5–15.5)
IMM GRANULOCYTES NFR BLD AUTO: 0.2 % — SIGNIFICANT CHANGE UP (ref 0–1.5)
KETONES UR-MCNC: NEGATIVE — SIGNIFICANT CHANGE UP
LEUKOCYTE ESTERASE UR-ACNC: NEGATIVE — SIGNIFICANT CHANGE UP
LIDOCAIN IGE QN: 45.9 U/L — SIGNIFICANT CHANGE UP (ref 7–60)
LYMPHOCYTES # BLD AUTO: 2.84 K/UL — SIGNIFICANT CHANGE UP (ref 1–3.3)
LYMPHOCYTES # BLD AUTO: 33 % — SIGNIFICANT CHANGE UP (ref 13–44)
MCHC RBC-ENTMCNC: 29.5 PG — SIGNIFICANT CHANGE UP (ref 27–34)
MCHC RBC-ENTMCNC: 32.7 % — SIGNIFICANT CHANGE UP (ref 32–36)
MCV RBC AUTO: 90.2 FL — SIGNIFICANT CHANGE UP (ref 80–100)
MONOCYTES # BLD AUTO: 0.57 K/UL — SIGNIFICANT CHANGE UP (ref 0–0.9)
MONOCYTES NFR BLD AUTO: 6.6 % — SIGNIFICANT CHANGE UP (ref 2–14)
NEUTROPHILS # BLD AUTO: 5 K/UL — SIGNIFICANT CHANGE UP (ref 1.8–7.4)
NEUTROPHILS NFR BLD AUTO: 58.2 % — SIGNIFICANT CHANGE UP (ref 43–77)
NITRITE UR-MCNC: NEGATIVE — SIGNIFICANT CHANGE UP
NRBC # FLD: 0 K/UL — SIGNIFICANT CHANGE UP (ref 0–0)
PH UR: 8.5 — HIGH (ref 5–8)
PLATELET # BLD AUTO: 307 K/UL — SIGNIFICANT CHANGE UP (ref 150–400)
PMV BLD: 10.6 FL — SIGNIFICANT CHANGE UP (ref 7–13)
POTASSIUM SERPL-MCNC: 4.1 MMOL/L — SIGNIFICANT CHANGE UP (ref 3.5–5.3)
POTASSIUM SERPL-SCNC: 4.1 MMOL/L — SIGNIFICANT CHANGE UP (ref 3.5–5.3)
PROT SERPL-MCNC: 8.2 G/DL — SIGNIFICANT CHANGE UP (ref 6–8.3)
PROT UR-MCNC: NEGATIVE — SIGNIFICANT CHANGE UP
RBC # BLD: 4.68 M/UL — SIGNIFICANT CHANGE UP (ref 3.8–5.2)
RBC # FLD: 12.8 % — SIGNIFICANT CHANGE UP (ref 10.3–14.5)
SODIUM SERPL-SCNC: 141 MMOL/L — SIGNIFICANT CHANGE UP (ref 135–145)
SP GR SPEC: 1.01 — SIGNIFICANT CHANGE UP (ref 1–1.04)
TROPONIN T, HIGH SENSITIVITY: < 6 NG/L — SIGNIFICANT CHANGE UP (ref ?–14)
UROBILINOGEN FLD QL: NORMAL — SIGNIFICANT CHANGE UP
WBC # BLD: 8.61 K/UL — SIGNIFICANT CHANGE UP (ref 3.8–10.5)
WBC # FLD AUTO: 8.61 K/UL — SIGNIFICANT CHANGE UP (ref 3.8–10.5)

## 2020-07-01 PROCEDURE — 93010 ELECTROCARDIOGRAM REPORT: CPT

## 2020-07-01 PROCEDURE — 99284 EMERGENCY DEPT VISIT MOD MDM: CPT | Mod: 25

## 2020-07-01 PROCEDURE — 74177 CT ABD & PELVIS W/CONTRAST: CPT | Mod: 26

## 2020-07-01 RX ORDER — SODIUM CHLORIDE 9 MG/ML
1000 INJECTION INTRAMUSCULAR; INTRAVENOUS; SUBCUTANEOUS ONCE
Refills: 0 | Status: COMPLETED | OUTPATIENT
Start: 2020-07-01 | End: 2020-07-01

## 2020-07-01 RX ORDER — LISINOPRIL 2.5 MG/1
10 TABLET ORAL DAILY
Refills: 0 | Status: DISCONTINUED | OUTPATIENT
Start: 2020-07-01 | End: 2020-07-05

## 2020-07-01 RX ORDER — FAMOTIDINE 10 MG/ML
20 INJECTION INTRAVENOUS ONCE
Refills: 0 | Status: COMPLETED | OUTPATIENT
Start: 2020-07-01 | End: 2020-07-01

## 2020-07-01 RX ORDER — FAMOTIDINE 10 MG/ML
1 INJECTION INTRAVENOUS
Qty: 60 | Refills: 0
Start: 2020-07-01 | End: 2020-07-30

## 2020-07-01 RX ADMIN — FAMOTIDINE 20 MILLIGRAM(S): 10 INJECTION INTRAVENOUS at 07:47

## 2020-07-01 RX ADMIN — Medication 30 MILLILITER(S): at 07:47

## 2020-07-01 RX ADMIN — LISINOPRIL 10 MILLIGRAM(S): 2.5 TABLET ORAL at 08:04

## 2020-07-01 RX ADMIN — SODIUM CHLORIDE 1000 MILLILITER(S): 9 INJECTION INTRAMUSCULAR; INTRAVENOUS; SUBCUTANEOUS at 08:04

## 2020-07-01 RX ADMIN — Medication 30 MILLILITER(S): at 12:08

## 2020-07-01 NOTE — ED PROVIDER NOTE - CLINICAL SUMMARY MEDICAL DECISION MAKING FREE TEXT BOX
60 y/o F pmh HTN, DM, hypothyroidism c/o LUQ abd pain, vomiting, diarrhea x 130am this morning. Pt reports decreased appetite x 3 days. Had 3 episodes of nb/nb vomiting. Took mylanta without relief and states it gave her diarrhea, 3x, watery brown, no blood.  hypertensive: 200/84  tender peiumbilical, no cva  -gastritis, r/o other abd pathology  -labs, ua, ctap, maalox, pepcid, po home dose of lisinopril, ekg 60 y/o F pmh HTN, DM, hypothyroidism c/o LUQ abd pain, vomiting, diarrhea x 130am this morning. Pt reports decreased appetite x 3 days. Had 3 episodes of nb/nb vomiting. Took mylanta without relief and states it gave her diarrhea, 3x, watery brown, no blood.  hypertensive: 200/84  tender periumbilical, no cva  -gastritis, r/o other abd pathology  -labs, ua, ctap, maalox, pepcid, po home dose of lisinopril, ekg

## 2020-07-01 NOTE — ED ADULT NURSE NOTE - CHIEF COMPLAINT QUOTE
alert oriented Belizean speaking  [id number] used c/o abd pain and burning  vomited x 3 abd feels bloated having diarrhea  hx DM thyroid HTN

## 2020-07-01 NOTE — ED PROVIDER NOTE - NSFOLLOWUPINSTRUCTIONS_ED_ALL_ED_FT
Advance activity as tolerated.  Continue all previously prescribed medications as directed unless otherwise instructed.  Follow up with your primary care physician in 48-72 hours- bring copies of your results.  Return to the ER for worsening or persistent symptoms, and/or ANY NEW OR CONCERNING SYMPTOMS. If you have issues obtaining follow up, please call: [phone] to obtain a doctor or specialist who takes your insurance in your area.  You may call [phone] to make an appointment with the internal medicine clinic.

## 2020-07-01 NOTE — ED ADULT NURSE REASSESSMENT NOTE - NS ED NURSE REASSESS COMMENT FT1
MASOOD An reviewed discharge instructions with patient and removed IV. No further orders given at time of discharge.

## 2020-07-01 NOTE — ED ADULT TRIAGE NOTE - CHIEF COMPLAINT QUOTE
alert oriented Guyanese speaking  [id number] used c/o abd pain and burning  vomited x 3 abd feels bloated having diarrhea  hx DM thyroid HTN

## 2020-07-01 NOTE — ED ADULT NURSE NOTE - OBJECTIVE STATEMENT
Pt received to room 24 complaining of left sided abdominal pain since 1am. AxOx4 and ambulatory at baseline. Pt endorses burning in her stomach and states she cannot eat or drink anything without burning sensation. Pt states it feels better "when I press on my stomach." Pt endorses vomiting 3x, denies blood in vomit. Pt endorses 5 episodes of diarrhea, denies blood in stool. Pt appears in NAD at this time, respirations even and unlabored, pt hypertensive at this time, MD aware. Pt denies headache, dizziness, c/p, SOB, fever, chills and cough. 20G IV placed to left arm by GREG Holden, labs drawn and sent. Will medicate pt as ordered. Will continue to monitor.

## 2020-07-01 NOTE — ED PROVIDER NOTE - PATIENT PORTAL LINK FT
You can access the FollowMyHealth Patient Portal offered by [facility] by registering at the following website: [email or web address]. By joining [employer]’s FollowMyHealth portal, you will also be able to view your health information using other applications (apps) compatible with our system.

## 2020-07-01 NOTE — ED PROVIDER NOTE - PROGRESS NOTE DETAILS
MASOOD Barksdale: Pt feeling better. labs, ua, ctap wnl. Gave GI referral sheet. MASOOD Barksdale: Pt feeling better. labs, ua, ctap wnl. Gave GI referral sheet.   # [id number]

## 2020-07-01 NOTE — ED PROVIDER NOTE - OBJECTIVE STATEMENT
58 y/o F pmh HTN, DM, hypothyroidism c/o LUQ abd pain, vomiting, diarrhea x 130am this morning. Pt reports 58 y/o F pmh HTN, DM, hypothyroidism c/o LUQ abd pain, vomiting, diarrhea x 130am this morning. Pt reports decreased appetite x 3 days. Had 3 episodes of nb/nb vomiting. Took mylanta without relief and states it gave her diarrhea, 3x, watery brown, no blood. Pt reports compliance with her medication. Has had this pain before. Denies fever, cp, sob, sick contacts, dysuria, hematuria.

## 2020-07-01 NOTE — ED PROVIDER NOTE - ATTENDING CONTRIBUTION TO CARE
I performed a face to face evaluation of this patient and obtained a history and performed a full exam.  I agree with the history, physical exam and plan of the PA.    Brief HPI:  58 y/o F pmh HTN, DM, hypothyroidism c/o LUQ abd pain, vomiting, diarrhea since this morning.      Vitals:   Reviewed    Exam:    GEN:  Non-toxic appearing, non-distressed, speaking full sentences, non-diaphoretic, AAOx3  HEENT:  NCAT, neck supple, EOMI, PERRLA, sclera anicteric, no conjunctival pallor or injection, no stridor, normal voice, no tonsillar exudate, uvula midline, tympanic membranes and external auditory canals normal appearing bilaterally   CV:  regular rhythm and rate, s1/s2 audible, no murmurs, rubs or gallops, peripheral pulses 2+ and symmetric  PULM:  non-labored respirations, lungs clear to auscultation bilaterally, no wheezes, crackles or rales  ABD:  non distended, ttp in luq, no rebound, no guarding, negative Holliday's sign, bowel sounds normal, no cvat  MSK:  no gross deformity, non-tender extremities and joints, range of motion grossly normal appearing, no extremity edema, extremities warm and well perfused   NEURO:  AAOx3, CN II-XII intact, motor 5/5 in upper and lower extremities bilaterally, sensation grossly intact in extremities and trunk, finger to nose testing wnl, no nystagmus, negative Romberg, no pronator drift, no gait deficit  SKIN:  warm, dry, no rash or vesicles     A/P:  58 y/o F pmh HTN, DM, hypothyroidism c/o LUQ abd pain, vomiting, diarrhea.  Hypertensive on arrival.  Abdomen tender in luq without e/o peritonitis.  Suspect gerd vs. viral syndrome, however would not expect this degree of tenderness in luq.  Low c/f atypical acs, however patient with dm.  Will send labs, ekg, ct, supportive care.  Dispo pending.

## 2022-08-09 NOTE — ED PROVIDER NOTE - CLINICAL SUMMARY MEDICAL DECISION MAKING FREE TEXT BOX
Left voicemail regarding MNCM BP check. Asked patient recheck bp and call us back.  Juliette Pennington Lpn  
Pt with intermittent CP, worsening recently and near syncope today. Plan: EKG, labs including trop and pro-bnp, CXR, admit for CDU for ACS w/u and echo.

## 2023-02-08 ENCOUNTER — EMERGENCY (EMERGENCY)
Facility: HOSPITAL | Age: 63
LOS: 1 days | Discharge: HOME | End: 2023-02-08
Attending: EMERGENCY MEDICINE | Admitting: STUDENT IN AN ORGANIZED HEALTH CARE EDUCATION/TRAINING PROGRAM
Payer: MEDICAID

## 2023-02-08 VITALS
TEMPERATURE: 98 F | RESPIRATION RATE: 16 BRPM | OXYGEN SATURATION: 100 % | DIASTOLIC BLOOD PRESSURE: 69 MMHG | HEART RATE: 51 BPM | SYSTOLIC BLOOD PRESSURE: 162 MMHG

## 2023-02-08 LAB
ALBUMIN SERPL ELPH-MCNC: 5.1 G/DL — HIGH (ref 3.3–5)
ALP SERPL-CCNC: 85 U/L — SIGNIFICANT CHANGE UP (ref 40–120)
ALT FLD-CCNC: 14 U/L — SIGNIFICANT CHANGE UP (ref 4–33)
ANION GAP SERPL CALC-SCNC: 13 MMOL/L — SIGNIFICANT CHANGE UP (ref 7–14)
AST SERPL-CCNC: 20 U/L — SIGNIFICANT CHANGE UP (ref 4–32)
BASOPHILS # BLD AUTO: 0.03 K/UL — SIGNIFICANT CHANGE UP (ref 0–0.2)
BASOPHILS NFR BLD AUTO: 0.4 % — SIGNIFICANT CHANGE UP (ref 0–2)
BILIRUB SERPL-MCNC: 0.4 MG/DL — SIGNIFICANT CHANGE UP (ref 0.2–1.2)
BUN SERPL-MCNC: 15 MG/DL — SIGNIFICANT CHANGE UP (ref 7–23)
CALCIUM SERPL-MCNC: 10 MG/DL — SIGNIFICANT CHANGE UP (ref 8.4–10.5)
CHLORIDE SERPL-SCNC: 104 MMOL/L — SIGNIFICANT CHANGE UP (ref 98–107)
CO2 SERPL-SCNC: 23 MMOL/L — SIGNIFICANT CHANGE UP (ref 22–31)
CREAT SERPL-MCNC: 0.65 MG/DL — SIGNIFICANT CHANGE UP (ref 0.5–1.3)
EGFR: 99 ML/MIN/1.73M2 — SIGNIFICANT CHANGE UP
EOSINOPHIL # BLD AUTO: 0.14 K/UL — SIGNIFICANT CHANGE UP (ref 0–0.5)
EOSINOPHIL NFR BLD AUTO: 1.8 % — SIGNIFICANT CHANGE UP (ref 0–6)
FLUAV AG NPH QL: SIGNIFICANT CHANGE UP
FLUBV AG NPH QL: SIGNIFICANT CHANGE UP
GLUCOSE SERPL-MCNC: 107 MG/DL — HIGH (ref 70–99)
HCT VFR BLD CALC: 42.9 % — SIGNIFICANT CHANGE UP (ref 34.5–45)
HGB BLD-MCNC: 14 G/DL — SIGNIFICANT CHANGE UP (ref 11.5–15.5)
IANC: 4.22 K/UL — SIGNIFICANT CHANGE UP (ref 1.8–7.4)
IMM GRANULOCYTES NFR BLD AUTO: 0.3 % — SIGNIFICANT CHANGE UP (ref 0–0.9)
LYMPHOCYTES # BLD AUTO: 2.78 K/UL — SIGNIFICANT CHANGE UP (ref 1–3.3)
LYMPHOCYTES # BLD AUTO: 36.3 % — SIGNIFICANT CHANGE UP (ref 13–44)
MCHC RBC-ENTMCNC: 29.7 PG — SIGNIFICANT CHANGE UP (ref 27–34)
MCHC RBC-ENTMCNC: 32.6 GM/DL — SIGNIFICANT CHANGE UP (ref 32–36)
MCV RBC AUTO: 90.9 FL — SIGNIFICANT CHANGE UP (ref 80–100)
MONOCYTES # BLD AUTO: 0.46 K/UL — SIGNIFICANT CHANGE UP (ref 0–0.9)
MONOCYTES NFR BLD AUTO: 6 % — SIGNIFICANT CHANGE UP (ref 2–14)
NEUTROPHILS # BLD AUTO: 4.22 K/UL — SIGNIFICANT CHANGE UP (ref 1.8–7.4)
NEUTROPHILS NFR BLD AUTO: 55.2 % — SIGNIFICANT CHANGE UP (ref 43–77)
NRBC # BLD: 0 /100 WBCS — SIGNIFICANT CHANGE UP (ref 0–0)
NRBC # FLD: 0 K/UL — SIGNIFICANT CHANGE UP (ref 0–0)
NT-PROBNP SERPL-SCNC: 31 PG/ML — SIGNIFICANT CHANGE UP
PLATELET # BLD AUTO: 295 K/UL — SIGNIFICANT CHANGE UP (ref 150–400)
POTASSIUM SERPL-MCNC: 4.2 MMOL/L — SIGNIFICANT CHANGE UP (ref 3.5–5.3)
POTASSIUM SERPL-SCNC: 4.2 MMOL/L — SIGNIFICANT CHANGE UP (ref 3.5–5.3)
PROT SERPL-MCNC: 8.5 G/DL — HIGH (ref 6–8.3)
RBC # BLD: 4.72 M/UL — SIGNIFICANT CHANGE UP (ref 3.8–5.2)
RBC # FLD: 12.5 % — SIGNIFICANT CHANGE UP (ref 10.3–14.5)
RSV RNA NPH QL NAA+NON-PROBE: SIGNIFICANT CHANGE UP
SARS-COV-2 RNA SPEC QL NAA+PROBE: SIGNIFICANT CHANGE UP
SODIUM SERPL-SCNC: 140 MMOL/L — SIGNIFICANT CHANGE UP (ref 135–145)
TROPONIN T, HIGH SENSITIVITY RESULT: 6 NG/L — SIGNIFICANT CHANGE UP
TSH SERPL-MCNC: 6.94 UIU/ML — HIGH (ref 0.27–4.2)
WBC # BLD: 7.65 K/UL — SIGNIFICANT CHANGE UP (ref 3.8–10.5)
WBC # FLD AUTO: 7.65 K/UL — SIGNIFICANT CHANGE UP (ref 3.8–10.5)

## 2023-02-08 PROCEDURE — 71045 X-RAY EXAM CHEST 1 VIEW: CPT | Mod: 26

## 2023-02-08 PROCEDURE — 99223 1ST HOSP IP/OBS HIGH 75: CPT

## 2023-02-08 PROCEDURE — 70450 CT HEAD/BRAIN W/O DYE: CPT | Mod: 26,MG

## 2023-02-08 PROCEDURE — G1004: CPT

## 2023-02-08 RX ORDER — INSULIN LISPRO 100/ML
VIAL (ML) SUBCUTANEOUS AT BEDTIME
Refills: 0 | Status: DISCONTINUED | OUTPATIENT
Start: 2023-02-08 | End: 2023-02-12

## 2023-02-08 RX ORDER — LOSARTAN POTASSIUM 100 MG/1
100 TABLET, FILM COATED ORAL DAILY
Refills: 0 | Status: DISCONTINUED | OUTPATIENT
Start: 2023-02-08 | End: 2023-02-12

## 2023-02-08 RX ORDER — DEXTROSE 50 % IN WATER 50 %
25 SYRINGE (ML) INTRAVENOUS ONCE
Refills: 0 | Status: DISCONTINUED | OUTPATIENT
Start: 2023-02-08 | End: 2023-02-12

## 2023-02-08 RX ORDER — GLUCAGON INJECTION, SOLUTION 0.5 MG/.1ML
1 INJECTION, SOLUTION SUBCUTANEOUS ONCE
Refills: 0 | Status: DISCONTINUED | OUTPATIENT
Start: 2023-02-08 | End: 2023-02-12

## 2023-02-08 RX ORDER — INSULIN LISPRO 100/ML
VIAL (ML) SUBCUTANEOUS
Refills: 0 | Status: DISCONTINUED | OUTPATIENT
Start: 2023-02-08 | End: 2023-02-12

## 2023-02-08 RX ORDER — SODIUM CHLORIDE 9 MG/ML
1000 INJECTION, SOLUTION INTRAVENOUS
Refills: 0 | Status: DISCONTINUED | OUTPATIENT
Start: 2023-02-08 | End: 2023-02-12

## 2023-02-08 RX ORDER — DEXTROSE 50 % IN WATER 50 %
12.5 SYRINGE (ML) INTRAVENOUS ONCE
Refills: 0 | Status: DISCONTINUED | OUTPATIENT
Start: 2023-02-08 | End: 2023-02-12

## 2023-02-08 RX ORDER — ACETAMINOPHEN 500 MG
650 TABLET ORAL ONCE
Refills: 0 | Status: COMPLETED | OUTPATIENT
Start: 2023-02-08 | End: 2023-02-08

## 2023-02-08 RX ORDER — DEXTROSE 50 % IN WATER 50 %
15 SYRINGE (ML) INTRAVENOUS ONCE
Refills: 0 | Status: DISCONTINUED | OUTPATIENT
Start: 2023-02-08 | End: 2023-02-12

## 2023-02-08 RX ADMIN — Medication 650 MILLIGRAM(S): at 19:35

## 2023-02-08 NOTE — ED ADULT TRIAGE NOTE - CHIEF COMPLAINT QUOTE
Pt c/o  headache, lethargy , intermittent slow speech, facial and L arm numbness, blurry vision, dizziness , drifting to L side on ambulation and , sob since last Wed, Pt seen at [facility] on Wed.l  Pt uses bipap at nights

## 2023-02-08 NOTE — ED ADULT NURSE NOTE - OBJECTIVE STATEMENT
Pt arrived to ED room 27, aaox4, ambulatory, breathing even and unlabored. PMHx HTN, DM and hypotension. Pt states she took all her Am medications, Pt came to ED with c/o headache, right sided weakness, with chest pain for the past month . Pt states about a month ago she experienced a 10/10 headache that became accompanied by right sided weakness. The pt noted to have slurred speech at time of headache. As per son the pt did not want to come in sooner because she did not want to have to go to the ED. Upon assessment PERRLA noted, right sided facial droop. Pt has sensation on both sides of her face. Pt has equal strength bilaterally on upper and lower extremities. Pt denies SOB, chest pain, dizziness. Pt states her headache is 8/10 pain. #20G in right AC, tele showing sinus kristin, bed in lowest position, son at bedside. As per pt request pt son translated to pt.

## 2023-02-08 NOTE — ED PROVIDER NOTE - NS ED ROS FT
ROS:  GENERAL: No fever, no chills  HEENT: no vision changes, no trouble swallowing, no trouble speaking  CARDIAC: no chest pain  PULMONARY: no cough, no shortness of breath  GI: no abdominal pain, no nausea, no vomiting, no diarrhea, no constipation  : No dysuria, no frequency, no change in appearance or odor of urine  SKIN: no rashes  NEURO: see HPI  MSK: No joint pain  All other systems reviewed as negative. As per HPI

## 2023-02-08 NOTE — ED ADULT NURSE REASSESSMENT NOTE - NS ED NURSE REASSESS COMMENT FT1
report given to CDU GREG Diop pt transported in no apparent distress denies complaints
received report from GREG Shi pt aox3 in no apparent distress VSS, denies any complaints. pt admitted to CDU awaiting bed availability will continue to monitro

## 2023-02-08 NOTE — ED ADULT NURSE NOTE - CHIEF COMPLAINT QUOTE
Pt c/o  headache, lethargy , intermittent slow speech, facial and L arm numbness, blurry vision, dizziness , drifting to L side on ambulation and , sob since last Wed, Pt seen at [facility] on Wed.l  Pt uses bipap at nights

## 2023-02-08 NOTE — ED CDU PROVIDER INITIAL DAY NOTE - OBJECTIVE STATEMENT
62-year-old female with hypertension, diabetes, hyperlipidemia, hypothyroidism, complaining of headache with numbness and tingling to both sides of the face mostly on the lips, sometimes on hands and feet, on and off for 4 months.  Patient states that she would have a headache first then followed by all of these numbness and tingling sensation in face/hands/legs.  In the last 2 weeks patient was also having generalized weakness, and lightheadedness.  Patient has to walk with assistance.  Denies chest pain, nausea vomiting, fever, dizziness, chest pain, sob.   pt was at [facility] last week but all workup were normal. however pt did not remember clearly what studies she got.  Concern for possible complex migraine, unlikely TIA or stroke given symptoms resolve.  CDU for obs for MRI and neuro consult with echo given palpitations.

## 2023-02-08 NOTE — ED PROVIDER NOTE - NS ED ATTENDING STATEMENT MOD
This was a shared visit with the ANNY. I reviewed and verified the documentation and independently performed the documented:

## 2023-02-08 NOTE — ED PROVIDER NOTE - CLINICAL SUMMARY MEDICAL DECISION MAKING FREE TEXT BOX
62-year-old female with hypertension, diabetes, hyperlipidemia, hypothyroidism, complaining of headache with numbness and tingling. exam unremarkable. no neuro deficit. 62-year-old female with hypertension, diabetes, hyperlipidemia, hypothyroidism, complaining of episodes of headache with numbness and tingling. exam unremarkable. no neuro deficit. no slurred speech.   during IV placement, pt was experiencing pain and started to have headache, sob, and facial numbness/tingling again. no neuro deficits noticed. hr at rest 40-50 with bp 190/80.   anxiety attack vs migraine vs symptomatic bradycardia.   no concern for stroke at this time as pt presents with no neuro deficits.  CBC, CMP, TROP, TSH, cxr. tylenol for headache. 62-year-old female with hypertension, diabetes, hyperlipidemia, hypothyroidism, complaining of episodes of headache with numbness and tingling. exam unremarkable. no neuro deficit. no slurred speech.   during IV placement, pt was experiencing pain and started to have headache, sob, and facial numbness/tingling again. no neuro deficits noticed. hr at rest 40-50 with bp 190/80.   anxiety attack vs migraine vs symptomatic bradycardia vs hypothyroidism  no concern for stroke at this time as pt presents with no neuro deficits.  CBC, CMP, TROP, TSH, cxr. tylenol for headache.

## 2023-02-08 NOTE — ED CDU PROVIDER INITIAL DAY NOTE - ATTENDING APP SHARED VISIT CONTRIBUTION OF CARE
61 yo F with HTN, DM, HLD, hypothyroid a/w headache, numbness and tingling.  Pt reports having generalized weakness.  Reports going to [facility] last week with work up negative.  Pt noted to have feeling of unsteady gait and leaning to right when it occurred.  Pt reports symptoms have been occurring intermittently over the past 2 weeks.  Son, leda who was interpertating, reports pt becomes anxious and short of breath.  Denies chest pain, nausea vomiting, fever, dizziness, chest pain, sob.     GEN - NAD; well appearing; A+O x3; non-toxic appearing  CARD -s1s2, RRR, no M,G,R;   PULM - CTA b/l, symmetric breath sounds;   ABD -  +BS, ND, NT, soft, no guarding, no rebound, no masses;   BACK - no CVA tenderness, Normal  spine;   EXT - symmetric pulses, 2+ dp, capillary refill < 2 seconds, no cyanosis, no edema;   NEURO: alert, CN 2-12 intact, reflexes nl, sensation nl, coordination nl, motor 5/5 RUE/LUE/RLE/LLE/EHL/Plantar flexion, no pronator drift, gait nl     Concern for possible complex migraine, unlikely TIA or stroke given symptoms resolve.  CT head within normal limits, troponin 6, cbc and cmp wnl.  Placed in cdu for MRI and possible neuro consult with echo given palpitations.

## 2023-02-08 NOTE — ED PROVIDER NOTE - ATTENDING APP SHARED VISIT CONTRIBUTION OF CARE
Salome: I have seen and examined the patient face to face, have reviewed and addended the HPI, PE and a/p as necessary.     61 yo F with HTN, DM, HLD, hypothyroid a/w headache, numbness and tingling.  Pt reports having generalized weakness.  Reports going to [facility] last week with work up negative.  Pt noted to have feeling of unsteady gait and leaning to right when it occurred.  Pt reports symptoms have been occurring intermittently over the past 2 weeks.  Son, leda who was interpretating, reports pt becomes anxious and short of breath.  Denies chest pain, nausea vomiting, fever, dizziness, chest pain, sob.       GEN - NAD; well appearing; A+O x3; non-toxic appearing  CARD -s1s2, RRR, no M,G,R;   PULM - CTA b/l, symmetric breath sounds;   ABD -  +BS, ND, NT, soft, no guarding, no rebound, no masses;   BACK - no CVA tenderness, Normal  spine;   EXT - symmetric pulses, 2+ dp, capillary refill < 2 seconds, no cyanosis, no edema;   NEURO - no focal neuro deficits, no slurred speech    Concern for possible complex migraine, unlikely TIA or stroke given symptoms resolve.  Check cbc, cmp, trop, ct head.  Consider CDU for obs for MRI and possible neuro consult with echo given palpitations.

## 2023-02-08 NOTE — ED PROVIDER NOTE - CARE PLAN
Principal Discharge DX:	Facial tingling   1 Principal Discharge DX:	Facial tingling  Secondary Diagnosis:	Headache  Secondary Diagnosis:	Palpitations  Secondary Diagnosis:	Chest tightness  Secondary Diagnosis:	Bradycardia

## 2023-02-08 NOTE — ED CDU PROVIDER INITIAL DAY NOTE - CLINICAL SUMMARY MEDICAL DECISION MAKING FREE TEXT BOX
62-year-old female with hypertension, diabetes, hyperlipidemia, hypothyroidism, complaining of headache with numbness and tingling to both sides of the face mostly on the lips, sometimes on hands and feet, on and off for 4 months.  Patient states that she would have a headache first then followed by all of these numbness and tingling sensation in face/hands/legs.  In the last 2 weeks patient was also having generalized weakness, and lightheadedness.  Patient has to walk with assistance.  Denies chest pain, nausea vomiting, fever, dizziness, chest pain, sob.   pt was at [facility] last week but all workup were normal. however pt did not remember clearly what studies she got.  Concern for possible complex migraine, unlikely TIA or stroke given symptoms resolve.  CDU for obs for MRI and neuro consult with echo given palpitations.

## 2023-02-09 VITALS
SYSTOLIC BLOOD PRESSURE: 114 MMHG | TEMPERATURE: 97 F | DIASTOLIC BLOOD PRESSURE: 61 MMHG | OXYGEN SATURATION: 97 % | RESPIRATION RATE: 17 BRPM | HEART RATE: 53 BPM

## 2023-02-09 LAB
A1C WITH ESTIMATED AVERAGE GLUCOSE RESULT: 8.2 % — HIGH (ref 4–5.6)
ESTIMATED AVERAGE GLUCOSE: 189 — SIGNIFICANT CHANGE UP

## 2023-02-09 PROCEDURE — 70551 MRI BRAIN STEM W/O DYE: CPT | Mod: 26,MA

## 2023-02-09 PROCEDURE — 72141 MRI NECK SPINE W/O DYE: CPT | Mod: 26,MA

## 2023-02-09 PROCEDURE — 93306 TTE W/DOPPLER COMPLETE: CPT | Mod: 26

## 2023-02-09 PROCEDURE — 99238 HOSP IP/OBS DSCHRG MGMT 30/<: CPT

## 2023-02-09 PROCEDURE — 99245 OFF/OP CONSLTJ NEW/EST HI 55: CPT

## 2023-02-09 RX ADMIN — Medication 1 MILLIGRAM(S): at 04:23

## 2023-02-09 RX ADMIN — LOSARTAN POTASSIUM 100 MILLIGRAM(S): 100 TABLET, FILM COATED ORAL at 05:49

## 2023-02-09 NOTE — ED CDU PROVIDER DISPOSITION NOTE - PATIENT PORTAL LINK FT
You can access the FollowMyHealth Patient Portal offered by [facility] by registering at the following website: [email or web address]. By joining [employer]’s FollowMyHealth portal, you will also be able to view your health information using other applications (apps) compatible with our system.

## 2023-02-09 NOTE — ED CDU PROVIDER DISPOSITION NOTE - ATTENDING CONTRIBUTION TO CARE
I have personally performed a face to face medical and diagnostic evaluation of the patient. I have discussed with and reviewed the ANNY's note and agree with the History, ROS, Physical Exam and MDM unless otherwise indicated. A brief summary of my personal evaluation and impression can be found below.    Please see CDU Subsequent day note for further details.

## 2023-02-09 NOTE — ED CDU PROVIDER SUBSEQUENT DAY NOTE - ATTENDING APP SHARED VISIT CONTRIBUTION OF CARE
I have personally performed a face to face medical and diagnostic evaluation of the patient. I have discussed with and reviewed the ANNY's note and agree with the History, ROS, Physical Exam and MDM unless otherwise indicated. A brief summary of my personal evaluation and impression can be found below.    Jacquelin VASQUEZ: 62-year-old female with hypertension, diabetes, hyperlipidemia, hypothyroidism, complaining of headache with numbness and tingling to both sides of the face mostly on the lips, sometimes on hands and feet, on and off for 4 months.    Concern for possible complex migraine, unlikely TIA or stroke given symptoms resolve.  CDU for obs for MRI and neuro consult with echo given palpitations.     No acute events overnight, patient reports he is feeling much better this morning, reports that her numbness and tingling and headache have resolved.  Tolerated breakfast this morning without issue.  He can move everything and feel everything.  No change in her vision.    All other ROS negative, except as above and as per HPI and ROS section.    VITALS: Initial triage and subsequent vitals have been reviewed by me.  GEN APPEARANCE: WDWN, alert, non-toxic, NAD  HEAD: Atraumatic.  EYES: PERRLa, EOMI, vision grossly intact.   NECK: Supple  CV: RRR, S1S2, no c/r/m/g. Cap refill <2 seconds. No bruits.   LUNGS: CTAB. No abnormal breath sounds.  ABDOMEN: Soft, NTND. No guarding or rebound.   MSK/EXT: No spinal or extremity point tenderness. No CVA ttp. Pelvis stable. No peripheral edema.  NEURO: Alert, follows commands. Weight bearing normal. Speech normal. Sensation and motor normal x4 extremities. CN2-12 normal, coordination normal, ambulating normally. UE & LE 5/5 b/l.  SKIN: Warm, dry and intact. No rash.  PSYCH: Appropriate    Plan/MDM:    Jacquelin VASQUEZ: 62-year-old female with hypertension, diabetes, hyperlipidemia, hypothyroidism, complaining of headache with numbness and tingling to both sides of the face mostly on the lips, sometimes on hands and feet, on and off for 4 months.    Concern for possible complex migraine, unlikely TIA or stroke given symptoms resolve.  CDU for obs for MRI and neuro consult with echo given palpitations.  Exam vital signs stable nontoxic-appearing with physical exam as above, DDx concern for possible complex/ migraine, pending MRI results this morning will re-discuss with neurology as indicated reassess, dispo accordingly thereafter.

## 2023-02-09 NOTE — ED CDU PROVIDER SUBSEQUENT DAY NOTE - CLINICAL SUMMARY MEDICAL DECISION MAKING FREE TEXT BOX
62-year-old female with hypertension, diabetes, hyperlipidemia, hypothyroidism, complaining of headache with numbness and tingling to both sides of the face mostly on the lips, sometimes on hands and feet, on and off for 4 months.  Patient states that she would have a headache first then followed by all of these numbness and tingling sensation in face/hands/legs.  In the last 2 weeks patient was also having generalized weakness, and lightheadedness.  Patient has to walk with assistance.  Denies chest pain, nausea vomiting, fever, dizziness, chest pain, sob.   pt was at [facility] last week but all workup were normal. however pt did not remember clearly what studies she got.  Concern for possible complex migraine, unlikely TIA or stroke given symptoms resolve.  CDU for obs for MRI and neuro consult with echo given palpitations.

## 2023-02-09 NOTE — ED CDU PROVIDER DISPOSITION NOTE - NSFOLLOWUPINSTRUCTIONS_ED_ALL_ED_FT
Follow up with neurology (referral list provided or you may follow up in the clinic, please call [phone]) within 1-2 weeks.   Take Tylenol 650mg (Two 325 mg pills) every 4-6 hours as needed for headache.  Advance activity as tolerated.  Continue all previously prescribed medications as directed unless otherwise instructed.  Follow up with your primary care physician in 48-72 hours- bring copies of your results.  Return to the ER for worsening or persistent symptoms, and/or ANY NEW OR CONCERNING SYMPTOMS: weakness, numbness, tingling, fever, nausea, vomiting. If you have issues obtaining follow up, please call: [phone] to obtain a doctor or specialist who takes your insurance in your area.  You may call [phone] to make an appointment with the internal medicine clinic. Follow up with cardiology for slow heart rate (referral list provided or you may call [phone] to schedule an appointment). Most patients can be seen within 48 hours. Mention that you were just discharged from the emergency room and need to be seen immediately.; or you may call [phone] to make an appointment with the cardiology clinic)  Follow up with neurology (referral list provided or you may follow up in the clinic, please call [phone]) within 1-2 weeks.   Take Tylenol 650mg (Two 325 mg pills) every 4-6 hours as needed for headache.  Advance activity as tolerated.  Continue all previously prescribed medications as directed unless otherwise instructed.  Follow up with your primary care physician in 48-72 hours- bring copies of your results.  Return to the ER for worsening or persistent symptoms, and/or ANY NEW OR CONCERNING SYMPTOMS: weakness, numbness, tingling, fever, nausea, vomiting. If you have issues obtaining follow up, please call: [phone] to obtain a doctor or specialist who takes your insurance in your area.  You may call [phone] to make an appointment with the internal medicine clinic.

## 2023-02-09 NOTE — CONSULT NOTE ADULT - ATTENDING COMMENTS
seen and examined on rounds  imaging personally reviewed    4 months of scalp pain, headache  she has left arm weakness which to me sounds pain limited  there are various neurologic symptoms, transient  she feels ok today  does endorse stress, at times significant    on exam there is no significant deficit  mild pain on passive internal rotation of left shoulder  DTRS are symmetric and toes downgoing    MRI reviewed with scattered white matter changes, otherwise nl  C spine unrevealing     impression is primary headache, perhaps tension component    ok to dc with outpatient neuro f/u

## 2023-02-09 NOTE — ED CDU PROVIDER DISPOSITION NOTE - CLINICAL COURSE
62-year-old female with hypertension, diabetes, hyperlipidemia, hypothyroidism, complaining of headache with numbness and tingling to both sides of the face mostly on the lips, sometimes on hands and feet, on and off for 4 months.    Concern for possible complex migraine, unlikely TIA or stroke given symptoms resolve.  CDU for obs for MRI and neuro consult with echo given palpitations.  overnight pt reports no returning of symptoms. MRI showed Multiple small rounded nonspecific abnormal white matter foci of T2/FLAIR prolongation statistically favoring microvascular disease.  discussed with neurology about MRI report who recommended outpatient f/u as it's chronic finding and unspecific.

## 2023-02-09 NOTE — CONSULT NOTE ADULT - SUBJECTIVE AND OBJECTIVE BOX
Neurology Consultation     HPI: Patient ROBSON is a 61 yo F w/ HTN, DM, HLD, hypothyroidism, p/w several months of headache, numbness and tingling to both sides of the face mostly on the lips, sometimes on hands and feet.  Patient states that she would have a headache first then followed by all of these numbness and tingling sensation in face/hands/legs.  In the last 2 weeks patient was also having generalized weakness, and lightheadedness.  Patient has to walk with assistance.  Denies chest pain, nausea vomiting, fever, dizziness, chest pain, sob. Pt was at [facility] last week but all workup were normal.      NIHSS:   preMRS:      PAST MEDICAL & SURGICAL HISTORY:  DM (diabetes mellitus)    Hypothyroidism    Essential hypertension    No significant past surgical history    FAMILY HISTORY:  Family history of essential hypertension  Mother    SOCIAL HISTORY:     MEDICATIONS (HOME):  Home Medications:  chlorthalidone 25 mg oral tablet: 1 tab(s) orally once a day (25 Mar 2019 12:26)  glipiZIDE 5 mg oral tablet: 1 tab(s) orally 2 times a day (25 Mar 2019 12:26)  levothyroxine 150 mcg (0.15 mg) oral capsule: 1 cap(s) orally once a day (25 Mar 2019 12:26)  losartan 100 mg oral tablet: 1 tab(s) orally once a day (25 Mar 2019 12:26)  metFORMIN 1000 mg oral tablet, extended release: 1 tab(s) orally 2 times a day (25 Mar 2019 12:26)  omeprazole 20 mg oral delayed release capsule: 1 cap(s) orally once a day (25 Mar 2019 12:26)    MEDICATIONS  (STANDING):  dextrose 5%. 1000 milliLiter(s) (100 mL/Hr) IV Continuous <Continuous>  dextrose 5%. 1000 milliLiter(s) (50 mL/Hr) IV Continuous <Continuous>  dextrose 50% Injectable 25 Gram(s) IV Push once  dextrose 50% Injectable 12.5 Gram(s) IV Push once  dextrose 50% Injectable 25 Gram(s) IV Push once  glucagon  Injectable 1 milliGRAM(s) IntraMuscular once  insulin lispro (ADMELOG) corrective regimen sliding scale   SubCutaneous three times a day before meals  insulin lispro (ADMELOG) corrective regimen sliding scale   SubCutaneous at bedtime  losartan 100 milliGRAM(s) Oral daily    MEDICATIONS  (PRN):  dextrose Oral Gel 15 Gram(s) Oral once PRN Blood Glucose LESS THAN 70 milliGRAM(s)/deciliter    ALLERGIES/INTOLERANCES:  Allergies  No Known Allergies    Intolerances    VITALS & EXAMINATION:  Vital Signs Last 24 Hrs  T(C): 36.8 (08 Feb 2023 21:42), Max: 36.8 (08 Feb 2023 18:22)  T(F): 98.2 (08 Feb 2023 21:42), Max: 98.2 (08 Feb 2023 18:22)  HR: 47 (08 Feb 2023 21:42) (47 - 55)  BP: 119/77 (08 Feb 2023 21:42) (119/77 - 162/69)  BP(mean): --  RR: 15 (08 Feb 2023 21:42) (15 - 18)  SpO2: 100% (08 Feb 2023 21:42) (100% - 100%)    Parameters below as of 08 Feb 2023 21:33  Patient On (Oxygen Delivery Method): room air         LABORATORY:  CBC                       14.0   7.65  )-----------( 295      ( 08 Feb 2023 18:14 )             42.9     Chem 02-08    140  |  104  |  15  ----------------------------<  107<H>  4.2   |  23  |  0.65    Ca    10.0      08 Feb 2023 18:14    TPro  8.5<H>  /  Alb  5.1<H>  /  TBili  0.4  /  DBili  x   /  AST  20  /  ALT  14  /  AlkPhos  85  02-08    LFTs LIVER FUNCTIONS - ( 08 Feb 2023 18:14 )  Alb: 5.1 g/dL / Pro: 8.5 g/dL / ALK PHOS: 85 U/L / ALT: 14 U/L / AST: 20 U/L / GGT: x           Coagulopathy   Lipid Panel   A1c   Cardiac enzymes     U/A   CSF  Other    STUDIES & IMAGING: (EEG, CT, MR, U/S, TTE/BARBARA):    < from: CT Head No Cont (02.08.23 @ 19:26) >    ACC: [id number] EXAM:  CT BRAIN   ORDERED BY: PETR CRAWLEY     PROCEDURE DATE:  02/08/2023          INTERPRETATION:  CLINICAL INDICATION:  Facial numbness.    TECHNIQUE : Axial CT scanning of the brain was obtained from the skull   base to the vertexwithout the administration of intravenous contrast.   Coronal and sagittal reformatted images were subsequently obtained.    COMPARISON: None available.    FINDINGS:  No acute intracranial hemorrhage, mass effect, hydrocephalus, or midline   shift. No extra-axial collections.    Sulcal and ventricular prominence consistent with age appropriate   involutional change. Periventricular and subcortical white matter   hypodensities, consistent with mild microvascular changes.    The visualized paranasal sinuses are clear. Mastoid cells are well   aerated.    The calvarium is intact.    IMPRESSION:  No acute intra-cranial hemorrhage, mass effect, or midline shift.    --- End of Report ---    LINDA LAO MD; Resident Radiologist  This document has been electronically signed.  ALESHIA LAMA MD; Attending Radiologist  This document has been electronically signed. Feb 8 2023  7:58PM    < end of copied text >   Neurology Consultation     Language line  Bermudian [id number] Joshua    HPI: Patient BM is a 61 yo F w/ HTN, DM, HLD, hypothyroidism, KUMAR on CPAP machine, p/w 4 months of headache, numbness and tingling to both sides of the face mostly on the lips, sometimes on hands and feet. States she has been having headaches since four months, occur around 4-5 times per week, can last all day, and sometimes night, and are predominantly in parieto/occipital, neck region. Has associated nausea, phonophobia. Patient states that she would have a headache then followed by fluctuating numbness and tingling in face, hands, legs. Has pain in L shoulder. Otherwise denies vision changes (double/blurry), speech changes, memory difficulty, extremity weakness, chest discomfort, shortness of breath.       PAST MEDICAL & SURGICAL HISTORY:  DM (diabetes mellitus)    Hypothyroidism    Essential hypertension    No significant past surgical history    FAMILY HISTORY:  Family history of essential hypertension  Mother    SOCIAL HISTORY: denies smoking/ drug use.    MEDICATIONS (HOME):  Home Medications:  chlorthalidone 25 mg oral tablet: 1 tab(s) orally once a day (25 Mar 2019 12:26)  glipiZIDE 5 mg oral tablet: 1 tab(s) orally 2 times a day (25 Mar 2019 12:26)  levothyroxine 150 mcg (0.15 mg) oral capsule: 1 cap(s) orally once a day (25 Mar 2019 12:26)  losartan 100 mg oral tablet: 1 tab(s) orally once a day (25 Mar 2019 12:26)  metFORMIN 1000 mg oral tablet, extended release: 1 tab(s) orally 2 times a day (25 Mar 2019 12:26)  omeprazole 20 mg oral delayed release capsule: 1 cap(s) orally once a day (25 Mar 2019 12:26)    MEDICATIONS  (STANDING):  dextrose 5%. 1000 milliLiter(s) (100 mL/Hr) IV Continuous <Continuous>  dextrose 5%. 1000 milliLiter(s) (50 mL/Hr) IV Continuous <Continuous>  dextrose 50% Injectable 25 Gram(s) IV Push once  dextrose 50% Injectable 12.5 Gram(s) IV Push once  dextrose 50% Injectable 25 Gram(s) IV Push once  glucagon  Injectable 1 milliGRAM(s) IntraMuscular once  insulin lispro (ADMELOG) corrective regimen sliding scale   SubCutaneous three times a day before meals  insulin lispro (ADMELOG) corrective regimen sliding scale   SubCutaneous at bedtime  losartan 100 milliGRAM(s) Oral daily    MEDICATIONS  (PRN):  dextrose Oral Gel 15 Gram(s) Oral once PRN Blood Glucose LESS THAN 70 milliGRAM(s)/deciliter    ALLERGIES/INTOLERANCES:  Allergies  No Known Allergies    Intolerances    VITALS & EXAMINATION:  Vital Signs Last 24 Hrs  T(C): 36.8 (08 Feb 2023 21:42), Max: 36.8 (08 Feb 2023 18:22)  T(F): 98.2 (08 Feb 2023 21:42), Max: 98.2 (08 Feb 2023 18:22)  HR: 47 (08 Feb 2023 21:42) (47 - 55)  BP: 119/77 (08 Feb 2023 21:42) (119/77 - 162/69)  BP(mean): --  RR: 15 (08 Feb 2023 21:42) (15 - 18)  SpO2: 100% (08 Feb 2023 21:42) (100% - 100%)    Parameters below as of 08 Feb 2023 21:33  Patient On (Oxygen Delivery Method): room air    General:  Constitutional: Female, appears stated age, NAD  Head: Normocephalic; Eyes: clear sclera;   Extremities: No cyanosis;    Resp: breathing comfortably / regularly    Neurological (>12):  MS: Awake, alert. Oriented person, place situation. Follows commands. Attends to examiner  Language: Speech is hypophonic, clear, fluent, good repetition, comprehension, registration of words.  CNs: PERRL, no APD. VFF. EOMI no nystagmus. V1-3 intact LT, No ray facial asymmetry b/l. Hearing grossly normal b/l. Tongue midline.     Motor - Normal bulk and tone throughout. No pronator drift.    L/R         Deltoid  4+/5    Biceps   5/5      Triceps  4+/5             4+/5   L/R         Hip Flexion  5/5  Knee Extension  5/5  Dorsiflexion  5/5      Plantar Flexion 5/5     Sensation: Intact to LT b/l x 4 extremities  Reflexes L/R:  Biceps(C5) 2/3  BR(C6) 2/2   Triceps(C7)  2/3 Patellar(L4)   2/3 Ankle 2/2   + cross adductor reflex (when using reflex hammer on R leg, elicit reflex in LLE)   Toes: Upgoing in RLE  Coordination: No dysmetria to FTN b/l UE    LABORATORY:  CBC                       14.0   7.65  )-----------( 295      ( 08 Feb 2023 18:14 )             42.9     Chem 02-08    140  |  104  |  15  ----------------------------<  107<H>  4.2   |  23  |  0.65    Ca    10.0      08 Feb 2023 18:14    TPro  8.5<H>  /  Alb  5.1<H>  /  TBili  0.4  /  DBili  x   /  AST  20  /  ALT  14  /  AlkPhos  85  02-08    LFTs LIVER FUNCTIONS - ( 08 Feb 2023 18:14 )  Alb: 5.1 g/dL / Pro: 8.5 g/dL / ALK PHOS: 85 U/L / ALT: 14 U/L / AST: 20 U/L / GGT: x           Coagulopathy   Lipid Panel   A1c   Cardiac enzymes     U/A   CSF  Other    STUDIES & IMAGING: (EEG, CT, MR, U/S, TTE/BARBARA):    < from: CT Head No Cont (02.08.23 @ 19:26) >    ACC: [id number] EXAM:  CT BRAIN   ORDERED BY: PETR CRAWLEY     PROCEDURE DATE:  02/08/2023          INTERPRETATION:  CLINICAL INDICATION:  Facial numbness.    TECHNIQUE : Axial CT scanning of the brain was obtained from the skull   base to the vertexwithout the administration of intravenous contrast.   Coronal and sagittal reformatted images were subsequently obtained.    COMPARISON: None available.    FINDINGS:  No acute intracranial hemorrhage, mass effect, hydrocephalus, or midline   shift. No extra-axial collections.    Sulcal and ventricular prominence consistent with age appropriate   involutional change. Periventricular and subcortical white matter   hypodensities, consistent with mild microvascular changes.    The visualized paranasal sinuses are clear. Mastoid cells are well   aerated.    The calvarium is intact.    IMPRESSION:  No acute intra-cranial hemorrhage, mass effect, or midline shift.    --- End of Report ---    LINDA LAO MD; Resident Radiologist  This document has been electronically signed.  ALESHIA LAMA MD; Attending Radiologist  This document has been electronically signed. Feb 8 2023  7:58PM    < end of copied text >

## 2023-02-09 NOTE — ED CDU PROVIDER SUBSEQUENT DAY NOTE - HISTORY
62-year-old female with hypertension, diabetes, hyperlipidemia, hypothyroidism, complaining of headache with numbness and tingling to both sides of the face mostly on the lips, sometimes on hands and feet, on and off for 4 months.    Concern for possible complex migraine, unlikely TIA or stroke given symptoms resolve.  CDU for obs for MRI and neuro consult with echo given palpitations.

## 2023-02-09 NOTE — CONSULT NOTE ADULT - ASSESSMENT
Patient BM is a 63 yo F w/ HTN, DM, HLD, hypothyroidism, KUMAR on CPAP machine, p/w 4 months of headache, numbness and tingling to both sides of the face mostly on the lips, sometimes on hands and feet. States she has been having headaches since four months, occur around 4-5 times per week, can last all day, and sometimes night, and are predominantly in parieto/occipital, neck region. Has associated nausea, phonophobia. Patient states that she would have a headache then followed by fluctuating numbness and tingling in face, hands, legs. Has pain in L shoulder. Otherwise denies vision changes (double/blurry), speech changes, memory difficulty, extremity weakness, chest discomfort, shortness of breath.       CT head w/o con: Periventricular and subcortical white matter hypodensities, consistent with mild microvascular changes.     Impression: Months of parieto/occipital headaches, tenderness in suboccipital region with fluctuating sensory symptoms and phonophobia suspicious for primary headache disorder. However, neuro exam with LUE weakness in multiple muscle groups, asymmetric reflexes, upgoing toe RLE. Will evaluate for central causes.      Recommendations:  [ ] Will discuss if warrants antiplatelet agents after MRI imaging if concern for stroke.  [ ] Atorvastatin 40-80 mg daily titrated per LDL < 70  [ ] HgbA1C, fasting lipid panel, CBC, CMP, coag panel, troponin, B12, Folate, TSH, B1 / thiamine   [ ] MRI brain w/o con, MR cervical spine w/o con  [ ] TTE w/ bubble study if found with stroke  [ ] telemetry, EKG,  cardiac w/u per primary team     - Neuro-checks and VS q4h    - Fall precautions     To be discussed with neurology attending and will be formally staffed by attending during morning rounds. Recommendations will be finalized once signed by attending.

## 2023-02-20 ENCOUNTER — EMERGENCY (EMERGENCY)
Facility: HOSPITAL | Age: 63
LOS: 1 days | Discharge: HOME | End: 2023-02-20
Attending: STUDENT IN AN ORGANIZED HEALTH CARE EDUCATION/TRAINING PROGRAM | Admitting: EMERGENCY MEDICINE
Payer: MEDICAID

## 2023-02-20 VITALS
DIASTOLIC BLOOD PRESSURE: 78 MMHG | TEMPERATURE: 98 F | RESPIRATION RATE: 17 BRPM | OXYGEN SATURATION: 100 % | SYSTOLIC BLOOD PRESSURE: 152 MMHG | HEART RATE: 60 BPM

## 2023-02-20 LAB
A1C WITH ESTIMATED AVERAGE GLUCOSE RESULT: 8.2 % — HIGH (ref 4–5.6)
ALBUMIN SERPL ELPH-MCNC: 4.9 G/DL — SIGNIFICANT CHANGE UP (ref 3.3–5)
ALP SERPL-CCNC: 82 U/L — SIGNIFICANT CHANGE UP (ref 40–120)
ALT FLD-CCNC: 20 U/L — SIGNIFICANT CHANGE UP (ref 4–33)
ANION GAP SERPL CALC-SCNC: 13 MMOL/L — SIGNIFICANT CHANGE UP (ref 7–14)
AST SERPL-CCNC: 24 U/L — SIGNIFICANT CHANGE UP (ref 4–32)
BASOPHILS # BLD AUTO: 0.02 K/UL — SIGNIFICANT CHANGE UP (ref 0–0.2)
BASOPHILS NFR BLD AUTO: 0.3 % — SIGNIFICANT CHANGE UP (ref 0–2)
BILIRUB SERPL-MCNC: 0.3 MG/DL — SIGNIFICANT CHANGE UP (ref 0.2–1.2)
BUN SERPL-MCNC: 16 MG/DL — SIGNIFICANT CHANGE UP (ref 7–23)
CALCIUM SERPL-MCNC: 10.3 MG/DL — SIGNIFICANT CHANGE UP (ref 8.4–10.5)
CHLORIDE SERPL-SCNC: 104 MMOL/L — SIGNIFICANT CHANGE UP (ref 98–107)
CO2 SERPL-SCNC: 24 MMOL/L — SIGNIFICANT CHANGE UP (ref 22–31)
CREAT SERPL-MCNC: 0.68 MG/DL — SIGNIFICANT CHANGE UP (ref 0.5–1.3)
EGFR: 98 ML/MIN/1.73M2 — SIGNIFICANT CHANGE UP
EOSINOPHIL # BLD AUTO: 0.14 K/UL — SIGNIFICANT CHANGE UP (ref 0–0.5)
EOSINOPHIL NFR BLD AUTO: 1.9 % — SIGNIFICANT CHANGE UP (ref 0–6)
ESTIMATED AVERAGE GLUCOSE: 189 — SIGNIFICANT CHANGE UP
FLUAV AG NPH QL: SIGNIFICANT CHANGE UP
FLUBV AG NPH QL: SIGNIFICANT CHANGE UP
GLUCOSE SERPL-MCNC: 98 MG/DL — SIGNIFICANT CHANGE UP (ref 70–99)
HCT VFR BLD CALC: 42.6 % — SIGNIFICANT CHANGE UP (ref 34.5–45)
HGB BLD-MCNC: 13.5 G/DL — SIGNIFICANT CHANGE UP (ref 11.5–15.5)
IANC: 3.92 K/UL — SIGNIFICANT CHANGE UP (ref 1.8–7.4)
IMM GRANULOCYTES NFR BLD AUTO: 0.1 % — SIGNIFICANT CHANGE UP (ref 0–0.9)
LYMPHOCYTES # BLD AUTO: 2.86 K/UL — SIGNIFICANT CHANGE UP (ref 1–3.3)
LYMPHOCYTES # BLD AUTO: 38.9 % — SIGNIFICANT CHANGE UP (ref 13–44)
MCHC RBC-ENTMCNC: 28.8 PG — SIGNIFICANT CHANGE UP (ref 27–34)
MCHC RBC-ENTMCNC: 31.7 GM/DL — LOW (ref 32–36)
MCV RBC AUTO: 90.8 FL — SIGNIFICANT CHANGE UP (ref 80–100)
MONOCYTES # BLD AUTO: 0.41 K/UL — SIGNIFICANT CHANGE UP (ref 0–0.9)
MONOCYTES NFR BLD AUTO: 5.6 % — SIGNIFICANT CHANGE UP (ref 2–14)
NEUTROPHILS # BLD AUTO: 3.92 K/UL — SIGNIFICANT CHANGE UP (ref 1.8–7.4)
NEUTROPHILS NFR BLD AUTO: 53.2 % — SIGNIFICANT CHANGE UP (ref 43–77)
NRBC # BLD: 0 /100 WBCS — SIGNIFICANT CHANGE UP (ref 0–0)
NRBC # FLD: 0 K/UL — SIGNIFICANT CHANGE UP (ref 0–0)
PLATELET # BLD AUTO: 306 K/UL — SIGNIFICANT CHANGE UP (ref 150–400)
POTASSIUM SERPL-MCNC: 4.5 MMOL/L — SIGNIFICANT CHANGE UP (ref 3.5–5.3)
POTASSIUM SERPL-SCNC: 4.5 MMOL/L — SIGNIFICANT CHANGE UP (ref 3.5–5.3)
PROT SERPL-MCNC: 7.9 G/DL — SIGNIFICANT CHANGE UP (ref 6–8.3)
RBC # BLD: 4.69 M/UL — SIGNIFICANT CHANGE UP (ref 3.8–5.2)
RBC # FLD: 12 % — SIGNIFICANT CHANGE UP (ref 10.3–14.5)
RSV RNA NPH QL NAA+NON-PROBE: SIGNIFICANT CHANGE UP
SARS-COV-2 RNA SPEC QL NAA+PROBE: SIGNIFICANT CHANGE UP
SODIUM SERPL-SCNC: 141 MMOL/L — SIGNIFICANT CHANGE UP (ref 135–145)
TROPONIN T, HIGH SENSITIVITY RESULT: 10 NG/L — SIGNIFICANT CHANGE UP
TROPONIN T, HIGH SENSITIVITY RESULT: 6 NG/L — SIGNIFICANT CHANGE UP
TSH SERPL-MCNC: 8.05 UIU/ML — HIGH (ref 0.27–4.2)
WBC # BLD: 7.36 K/UL — SIGNIFICANT CHANGE UP (ref 3.8–10.5)
WBC # FLD AUTO: 7.36 K/UL — SIGNIFICANT CHANGE UP (ref 3.8–10.5)

## 2023-02-20 PROCEDURE — 99223 1ST HOSP IP/OBS HIGH 75: CPT

## 2023-02-20 PROCEDURE — 71046 X-RAY EXAM CHEST 2 VIEWS: CPT | Mod: 26

## 2023-02-20 RX ORDER — INSULIN LISPRO 100/ML
VIAL (ML) SUBCUTANEOUS
Refills: 0 | Status: DISCONTINUED | OUTPATIENT
Start: 2023-02-20 | End: 2023-02-24

## 2023-02-20 RX ORDER — LEVOTHYROXINE SODIUM 125 MCG
150 TABLET ORAL DAILY
Refills: 0 | Status: DISCONTINUED | OUTPATIENT
Start: 2023-02-20 | End: 2023-02-24

## 2023-02-20 RX ORDER — ACETAMINOPHEN 500 MG
975 TABLET ORAL EVERY 6 HOURS
Refills: 0 | Status: DISCONTINUED | OUTPATIENT
Start: 2023-02-20 | End: 2023-02-24

## 2023-02-20 RX ORDER — SODIUM CHLORIDE 9 MG/ML
1000 INJECTION, SOLUTION INTRAVENOUS
Refills: 0 | Status: DISCONTINUED | OUTPATIENT
Start: 2023-02-20 | End: 2023-02-24

## 2023-02-20 RX ORDER — DEXTROSE 50 % IN WATER 50 %
25 SYRINGE (ML) INTRAVENOUS ONCE
Refills: 0 | Status: DISCONTINUED | OUTPATIENT
Start: 2023-02-20 | End: 2023-02-24

## 2023-02-20 RX ORDER — ATORVASTATIN CALCIUM 80 MG/1
40 TABLET, FILM COATED ORAL AT BEDTIME
Refills: 0 | Status: DISCONTINUED | OUTPATIENT
Start: 2023-02-20 | End: 2023-02-24

## 2023-02-20 RX ORDER — ASPIRIN/CALCIUM CARB/MAGNESIUM 324 MG
81 TABLET ORAL DAILY
Refills: 0 | Status: DISCONTINUED | OUTPATIENT
Start: 2023-02-21 | End: 2023-02-24

## 2023-02-20 RX ORDER — ASPIRIN/CALCIUM CARB/MAGNESIUM 324 MG
324 TABLET ORAL ONCE
Refills: 0 | Status: COMPLETED | OUTPATIENT
Start: 2023-02-20 | End: 2023-02-20

## 2023-02-20 RX ORDER — DEXTROSE 50 % IN WATER 50 %
15 SYRINGE (ML) INTRAVENOUS ONCE
Refills: 0 | Status: DISCONTINUED | OUTPATIENT
Start: 2023-02-20 | End: 2023-02-24

## 2023-02-20 RX ORDER — GLUCAGON INJECTION, SOLUTION 0.5 MG/.1ML
1 INJECTION, SOLUTION SUBCUTANEOUS ONCE
Refills: 0 | Status: DISCONTINUED | OUTPATIENT
Start: 2023-02-20 | End: 2023-02-24

## 2023-02-20 RX ORDER — DEXTROSE 50 % IN WATER 50 %
12.5 SYRINGE (ML) INTRAVENOUS ONCE
Refills: 0 | Status: DISCONTINUED | OUTPATIENT
Start: 2023-02-20 | End: 2023-02-24

## 2023-02-20 RX ORDER — LOSARTAN POTASSIUM 100 MG/1
100 TABLET, FILM COATED ORAL DAILY
Refills: 0 | Status: DISCONTINUED | OUTPATIENT
Start: 2023-02-21 | End: 2023-02-24

## 2023-02-20 RX ORDER — PANTOPRAZOLE SODIUM 20 MG/1
40 TABLET, DELAYED RELEASE ORAL
Refills: 0 | Status: DISCONTINUED | OUTPATIENT
Start: 2023-02-21 | End: 2023-02-24

## 2023-02-20 RX ADMIN — Medication 975 MILLIGRAM(S): at 21:39

## 2023-02-20 RX ADMIN — Medication 324 MILLIGRAM(S): at 15:36

## 2023-02-20 RX ADMIN — ATORVASTATIN CALCIUM 40 MILLIGRAM(S): 80 TABLET, FILM COATED ORAL at 21:39

## 2023-02-20 RX ADMIN — Medication 975 MILLIGRAM(S): at 22:09

## 2023-02-20 NOTE — ED PROVIDER NOTE - OBJECTIVE STATEMENT
62-year-old female with past medical history of diabetes, hypertension, hyperlipidemia, hypothyroidism presents to ED complaining of left-sided chest pain, shortness of breath and palpitations for the past 4 months.  Symptoms are intermittent and occur at rest. Not pleuritic. Not exertional. Patient describes a left-sided heaviness that radiates to her left shoulder and left jaw with numbness and lightheadedness.  Patient was evaluated in CDU for facial numbness x few months, had recent MRI brain and cervical spine within normal limits and had an echocardiogram within normal limits.  No family history of MI.  Non-smoker.  Seen by Dr. Anjum Phillips in office today and sent to ER.  No syncope, nausea vomiting, weakness, dizziness, diaphoresis.  Patient deferring , daughter in law translating at bedside

## 2023-02-20 NOTE — ED CDU PROVIDER INITIAL DAY NOTE - ATTENDING APP SHARED VISIT CONTRIBUTION OF CARE
Fernandez Chawla DO:  patient seen and evaluated with the PA.  I was present for key portions of the History & Physical, and I agree with the Impression & Plan. 61 yo f pmh HTN, DM, HLD, hypothyroidism, KUMAR on CPAP machine, pw cp. Seen andReports left-sided chest pain, radiates to left arm, exertional, associated shortness of breath.  Reports malaise.  Resolved was recommended from the ED.  Of note was in this ED in CDU for neuro symptoms, from February through March.  At that time had a TTE, independently reviewed.  Patient arrives HDS, well-appearing, EKG NSR, nonischemic.  PE as noted. results dw pt/family, questions answered. case dw Dr. Phillips who requests cdu placement for cardiac w/u and plans to see pt in morning.

## 2023-02-20 NOTE — ED PROVIDER NOTE - CLINICAL SUMMARY MEDICAL DECISION MAKING FREE TEXT BOX
62-year-old female with past medical history of diabetes, hypertension, hyperlipidemia, hypothyroidism presents to ED complaining of left-sided chest pain, shortness of breath and palpitations for the past 4 months. Patient describes a left-sided heaviness that radiates to her left shoulder and left jaw with numbness and lightheadedness.  Patient was evaluated in CDU for facial numbness x few months, had recent MRI brain and cervical spine within normal limits and had an echocardiogram within normal limits. sent to ED by cards today. ekg with no signs of ischemia. plan to check labs, troponin, cxr, aspirin, place in cdu for stress test discussed plan with patients cardiologist Dr. Phillips

## 2023-02-20 NOTE — ED ADULT TRIAGE NOTE - NSTRIAGECARE_GEN_A_ER
Patient called.  States her OB/GYN wanted her to call about her recent EKG.  It had some changes and they would like Dr. Nunez to review, and advise of any changes.  Call her at [phone]   EKG

## 2023-02-20 NOTE — ED ADULT TRIAGE NOTE - CHIEF COMPLAINT QUOTE
Patient coming to ER complaining of chest pain that started one hour ago. States chest pain is sharp, midsternal and radiates to left arm. Patient noted to be bradycardic at baseline as per daughter. Patient is also experiencing facial numbness that has been going on for the past 3 months.

## 2023-02-20 NOTE — ED PROVIDER NOTE - ATTENDING APP SHARED VISIT CONTRIBUTION OF CARE
Fernandez Chawla DO:  patient seen and evaluated with the PA.  I was present for key portions of the History & Physical, and I agree with the Impression & Plan. 61 yo f pmh HTN, DM, HLD, hypothyroidism, KUMAR on CPAP machine, pw cp. Seen andReports left-sided chest pain, radiates to left arm, exertional, associated shortness of breath.  Reports malaise.  Resolved was recommended from the ED.  Of note was in this ED in CDU for neuro symptoms, from February through March.  At that time had a TTE, independently reviewed.  Patient arrives HDS, well-appearing, EKG NSR, nonischemic.  PE as noted.  Plan to admit for stress test. Fernandez Chawla DO:  patient seen and evaluated with the PA.  I was present for key portions of the History & Physical, and I agree with the Impression & Plan. 61 yo f pmh HTN, DM, HLD, hypothyroidism, KUMAR on CPAP machine, pw cp. Seen [facility] left-sided chest pain, radiates to left arm, exertional, associated shortness of breath.  Reports malaise.  Resolved was recommended from the ED.  Of note was in this ED in CDU for neuro symptoms, from February through March.  At that time had a TTE, independently reviewed.  Patient arrives HDS, well-appearing, EKG NSR, nonischemic.  PE as noted.  Plan to admit for stress test..

## 2023-02-20 NOTE — ED CDU PROVIDER INITIAL DAY NOTE - CLINICAL SUMMARY MEDICAL DECISION MAKING FREE TEXT BOX
62-year-old female with past medical history of diabetes, hypertension, hyperlipidemia, hypothyroidism presents to ED complaining of left-sided chest pain, shortness of breath and palpitations for the past 4 months. Patient describes a left-sided heaviness that radiates to her left shoulder and left jaw with numbness and lightheadedness.  Patient was evaluated in CDU for facial numbness x few months, had recent MRI brain and cervical spine within normal limits and had an echocardiogram within normal limits. sent to ED by cards today. ekg with no signs of ischemia. sent to cdu for stress test, tele and cardiologist Dr. Phillips to see tomorrow 2/21

## 2023-02-20 NOTE — ED ADULT NURSE NOTE - OBJECTIVE STATEMENT
Received pt into spot 10[facility], accompanied by daughter. Pt speaks primarily Romanian and some English. Wants daughter to translate for her. Pt c/o intermittent dizziness, chest pain and nausea x 3 months. Pt was seen by cardiology office today and instructed to come to ED. Pt denies nausea or chest pain at present but c/o dizziness coming and going. Pt eval by Dr. Chawla and Adelina CORREIA. Blood work obtained and sent as ordered. #20 angio cath placed to left anticubital saline locked.  mg chewable tabs given as ordered.

## 2023-02-21 VITALS
RESPIRATION RATE: 17 BRPM | HEART RATE: 50 BPM | SYSTOLIC BLOOD PRESSURE: 113 MMHG | OXYGEN SATURATION: 99 % | TEMPERATURE: 98 F | DIASTOLIC BLOOD PRESSURE: 72 MMHG

## 2023-02-21 PROCEDURE — 99238 HOSP IP/OBS DSCHRG MGMT 30/<: CPT

## 2023-02-21 PROCEDURE — 93016 CV STRESS TEST SUPVJ ONLY: CPT | Mod: GC

## 2023-02-21 PROCEDURE — 78452 HT MUSCLE IMAGE SPECT MULT: CPT | Mod: 26,MA

## 2023-02-21 PROCEDURE — 93018 CV STRESS TEST I&R ONLY: CPT | Mod: GC

## 2023-02-21 RX ORDER — KETOROLAC TROMETHAMINE 30 MG/ML
15 SYRINGE (ML) INJECTION ONCE
Refills: 0 | Status: DISCONTINUED | OUTPATIENT
Start: 2023-02-21 | End: 2023-02-21

## 2023-02-21 RX ORDER — METOCLOPRAMIDE HCL 10 MG
10 TABLET ORAL ONCE
Refills: 0 | Status: COMPLETED | OUTPATIENT
Start: 2023-02-21 | End: 2023-02-21

## 2023-02-21 RX ADMIN — Medication 150 MICROGRAM(S): at 06:22

## 2023-02-21 RX ADMIN — Medication 975 MILLIGRAM(S): at 17:35

## 2023-02-21 RX ADMIN — Medication 975 MILLIGRAM(S): at 15:05

## 2023-02-21 RX ADMIN — Medication 81 MILLIGRAM(S): at 13:06

## 2023-02-21 RX ADMIN — PANTOPRAZOLE SODIUM 40 MILLIGRAM(S): 20 TABLET, DELAYED RELEASE ORAL at 06:22

## 2023-02-21 RX ADMIN — Medication 15 MILLIGRAM(S): at 18:23

## 2023-02-21 RX ADMIN — Medication 10 MILLIGRAM(S): at 18:23

## 2023-02-21 RX ADMIN — Medication 15 MILLIGRAM(S): at 18:53

## 2023-02-21 NOTE — ED CDU PROVIDER DISPOSITION NOTE - CLINICAL COURSE
62-year-old female with past medical history of diabetes, hypertension, hyperlipidemia, hypothyroidism presents to ED complaining of left-sided chest pain, shortness of breath and palpitations for the past 4 months.  Symptoms are intermittent and occur at rest. Not pleuritic. Not exertional. Patient describes a left-sided heaviness that radiates to her left shoulder and left jaw with numbness and lightheadedness.  Patient was evaluated in CDU for facial numbness x few months, had recent MRI brain and cervical spine within normal limits and had an echocardiogram within normal limits.  No family history of MI.  Non-smoker.  Seen by Dr. Anjum Phillips today. tele unremarkable, NST negative. patient ok for dc

## 2023-02-21 NOTE — ED CDU PROVIDER SUBSEQUENT DAY NOTE - CLINICAL SUMMARY MEDICAL DECISION MAKING FREE TEXT BOX
62-year-old female with past medical history of diabetes, hypertension, hyperlipidemia, hypothyroidism presents to ED complaining of left-sided chest pain, shortness of breath and palpitations for the past 4 months. Plan is CDU placement for stress testing, telemetry monitoring, will eval for a cardiogenic etiology of pt's CP.

## 2023-02-21 NOTE — ED CDU PROVIDER SUBSEQUENT DAY NOTE - ATTENDING APP SHARED VISIT CONTRIBUTION OF CARE
Overnight events:  no overnight events.     Vitals:   Reviewed    Exam:    GEN:  Non-toxic appearing, non-distressed, speaking full sentences, non-diaphoretic, AAOx3  HEENT:  NCAT, neck supple, EOMI, PERRLA, sclera anicteric, no conjunctival pallor or injection, no stridor, normal voice, no tonsillar exudate, uvula midline  CV:  regular rhythm and rate, s1/s2 audible, no murmurs, rubs or gallops, peripheral pulses 2+ and symmetric  PULM:  non-labored respirations, lungs clear to auscultation bilaterally, no wheezes, crackles or rales  ABD:  non distended, non-tender, no rebound, no guarding, negative Holliday's sign, bowel sounds normal, no cvat  MSK:  no gross deformity, non-tender extremities and joints, range of motion grossly normal appearing, no extremity edema, extremities warm and well perfused   NEURO:  AAOx3, CN II-XII intact, motor 5/5 in upper and lower extremities bilaterally, sensation grossly intact in extremities and trunk, finger to nose testing wnl, no nystagmus, negative Romberg, no pronator drift, no gait deficit  SKIN:  warm, dry, no rash or vesicles     A/P:  Plan for stress testing, tele.

## 2023-02-21 NOTE — ED CDU PROVIDER SUBSEQUENT DAY NOTE - PROGRESS NOTE DETAILS
patient Panamanian speaking  Julio used this am, [id number]. patient reported no chest pain. patient complaint no is gradual onset headache, initially improved with Tylenol. no neuro deficits.     patient seen by Dr. Phillips, discussed negative results, patient cleared for dc.

## 2023-02-21 NOTE — ED CDU PROVIDER DISPOSITION NOTE - PATIENT PORTAL LINK FT
You can access the FollowMyHealth Patient Portal offered by [facility] [place] by registering at the following website: [email or web address]. By joining [facility]’s FollowMyHealth portal, you will also be able to view your health information using other applications (apps) compatible with our system.

## 2023-02-21 NOTE — CONSULT NOTE ADULT - SUBJECTIVE AND OBJECTIVE BOX
CHIEF COMPLAINT:Patient is a 62y old  Female who presents with a chief complaint of     HISTORY OF PRESENT ILLNESS:    62 female with history as below presents with chest pain       All other review of systems is negative unless indicated above.     PAST MEDICAL & SURGICAL HISTORY:  DM (diabetes mellitus)      Hypothyroidism      Essential hypertension      No significant past surgical history              MEDICATIONS:  aspirin  chewable 81 milliGRAM(s) Oral daily  losartan 100 milliGRAM(s) Oral daily        acetaminophen     Tablet .. 975 milliGRAM(s) Oral every 6 hours PRN    pantoprazole    Tablet 40 milliGRAM(s) Oral before breakfast    atorvastatin 40 milliGRAM(s) Oral at bedtime  dextrose 50% Injectable 25 Gram(s) IV Push once  dextrose 50% Injectable 12.5 Gram(s) IV Push once  dextrose 50% Injectable 25 Gram(s) IV Push once  dextrose Oral Gel 15 Gram(s) Oral once PRN  glucagon  Injectable 1 milliGRAM(s) IntraMuscular once  insulin lispro (ADMELOG) corrective regimen sliding scale   SubCutaneous three times a day before meals  levothyroxine 150 MICROGram(s) Oral daily    dextrose 5%. 1000 milliLiter(s) IV Continuous <Continuous>  dextrose 5%. 1000 milliLiter(s) IV Continuous <Continuous>      FAMILY HISTORY:  Family history of essential hypertension  Mother        Non-contributory    SOCIAL HISTORY:    No tobacco, drugs or etoh    Allergies    No Known Allergies    Intolerances    	    REVIEW OF SYSTEMS:  as above  The rest of the 14 points ROS reviewed and except above they are unremarkable.        PHYSICAL EXAM:  T(C): 36.4 (02-21-23 @ 06:22), Max: 36.6 (02-20-23 @ 14:07)  HR: 56 (02-21-23 @ 06:22) (52 - 60)  BP: 113/53 (02-21-23 @ 06:22) (109/65 - 152/78)  RR: 18 (02-21-23 @ 06:22) (15 - 18)  SpO2: 100% (02-21-23 @ 06:22) (98% - 100%)  Wt(kg): --  I&O's Summary      Appearance: Normal	  HEENT:   no gross abnormality   Cardiovascular: Normal S1 S2,    Murmur:   Neck: JVP normal  Respiratory: Lungs clear   Gastrointestinal:  Soft, Non-tender  Skin: normal   Neuro: No gross deficits.   Psychiatry:  Mood & affect flat  Ext: No edema    LABS/DATA:    TELEMETRY: 	    ECG:  	   	  CARDIAC MARKERS:                        6 <<== 02-20-23 @ 18:00                10 <<== 02-20-23 @ 15:39                              13.5   7.36  )-----------( 306      ( 20 Feb 2023 15:39 )             42.6     02-20    141  |  104  |  16  ----------------------------<  98  4.5   |  24  |  0.68    Ca    10.3      20 Feb 2023 15:39    TPro  7.9  /  Alb  4.9  /  TBili  0.3  /  DBili  x   /  AST  24  /  ALT  20  /  AlkPhos  82  02-20    proBNP:   Lipid Profile:   HgA1c:   TSH: Thyroid Stimulating Hormone, Serum: 8.05 uIU/mL (02-20 @ 15:39)

## 2023-02-21 NOTE — ED CDU PROVIDER DISPOSITION NOTE - NSFOLLOWUPINSTRUCTIONS_ED_ALL_ED_FT
FOLLOW UP WITH CARDIOLOGIST AND/ OR YOUR PRIMARY MEDICAL DOCTOR IN 2-3 DAYS OR WITHIN THE WEEK. SHOW COPIES OF YOUR RESULTS/REPORTS GIVEN TO YOU.  Take all of your other medications as previously prescribed. Worsening or continued chest pain, shortness of breath, weakness, return to ED.     Follow up with your Primary Medical Doctor within 2-3days. If results or reports were given to you, show copies of your reports given to you. Take all of your medications as previously prescribed.      If you have issues obtaining follow up, please call: [phone] to obtain a doctor or specialist who takes your insurance in your area.
156.21

## 2023-02-21 NOTE — ED CDU PROVIDER SUBSEQUENT DAY NOTE - HISTORY
62-year-old female with past medical history of diabetes, hypertension, hyperlipidemia, hypothyroidism presents to ED complaining of left-sided chest pain, shortness of breath and palpitations for the past 4 months.  Symptoms are intermittent and occur at rest. Not pleuritic. Not exertional. Patient describes a left-sided heaviness that radiates to her left shoulder and left jaw with numbness and lightheadedness.  Patient was evaluated in CDU for facial numbness x few months, had recent MRI brain and cervical spine within normal limits and had an echocardiogram within normal limits.  No family history of MI.  Non-smoker.  Seen by Dr. Anjum Phillips in office today and sent to ER.  No syncope, nausea vomiting, weakness, dizziness, diaphoresis.  Pt is resting comfortably while in CDU, no acute distress, spoke to the pt with  [id number].

## 2023-02-21 NOTE — CONSULT NOTE ADULT - ASSESSMENT
Chest pain   ruled out for acute MI   obtain stress test    DM II  Monitor finger stick. Insulin coverage. Diabetic education and Diabetic diet. Consider nutrition consultation.     HTN  stable

## 2023-04-12 ENCOUNTER — APPOINTMENT (OUTPATIENT)
Dept: PAIN MANAGEMENT | Facility: CLINIC | Age: 63
End: 2023-04-12
Payer: MEDICAID

## 2023-04-12 VITALS
BODY MASS INDEX: 30.44 KG/M2 | HEIGHT: 58 IN | WEIGHT: 145 LBS | SYSTOLIC BLOOD PRESSURE: 123 MMHG | HEART RATE: 56 BPM | DIASTOLIC BLOOD PRESSURE: 85 MMHG

## 2023-04-12 PROBLEM — Z00.00 ENCOUNTER FOR PREVENTIVE HEALTH EXAMINATION: Status: ACTIVE | Noted: 2023-04-12

## 2023-04-12 PROCEDURE — 99205 OFFICE O/P NEW HI 60 MIN: CPT

## 2023-04-12 NOTE — HISTORY OF PRESENT ILLNESS
[FreeTextEntry1] :  - ID [id number] \par \par Ms. BAILEY STEELE is a 62 year old RH female with Pmhx Anxiety, Depression, HTN, HLD, DM2, GERD, hypothyroidism, chronic neck and lower back pain s/p recent ER visit with 4 month h/o intermittent chest pain, palpitations and SOB r/o for ACS who is here for initial evaluation of headaches . Onset of headaches 2 months ago right back side of the head that becomes diffuse described as a burning 10/10 constant associated with sensitivity to light , noise, nausea, no vomiting, dizziness, dizziness/vertigo.  Denies aura or visual changes. She has tried APAP.  Triggers include decreases sleep, stress. \par \par She has difficulty sleeping -disrupted sleep- KUMAR using CPAP. \harley AVALOS has 1 servings of caffeine per day including green tea. \harley AVALOS  is exercises 3 times per week including walking 30 minutes 3x/week. \par \par \par Prior meds include:\par Current meds include:  Synthroid 125 mcg daily, metformin ER  500 mg 2x/day, Oxybutinin 10 mg daily, gabapentin 100mg daily, Atorvastatin 10 mg daily, Lisinopril, Ozempic, B12, D3, colace, Dulcolax, \par \par MRI brain Feb 2023 showed multiple small round nonspecific WM foci T2/Flair prolongations c/w microvascular disease. \par MRI C spine Feb 2023 showed mild multilevel cervical spondylosis. \par \par Social hx:  She is  with 7 children and 12 grandchildren and lives in [place].  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  She denies etoh, tobacco, denies illicits drug. \par \par \par

## 2023-04-12 NOTE — REVIEW OF SYSTEMS
[Neck Pain] : neck pain [Lower Back Pain] : lower back pain [As Noted in HPI] : as noted in HPI [Sleep Disturbances] : sleep disturbances [Sleep Apnea] : sleep apnea [Anxiety] : anxiety [Depression] : depression [Negative] : Heme/Lymph

## 2023-04-12 NOTE — PHYSICAL EXAM
[FreeTextEntry1] : General appearance: Well nourished and with attention to grooming.No signs of distress. No signs of toxicity.\par Head/Neck/spine: + tenderness over B/L greater occipital nerves, Examination of the cervical spine reveals normal range of motion in the neck, no midline tenderness, + paraspinal muscle and traps tenderness, no facet tenderness, negative Spurling's bilaterally. Examination of the lumbar spine reveals normal range of motion including flexion, extension and lateral rotation. No midline tenderness, no paraspinal muscle tenderness, negative facet loading,  no tenderness of sciatic notch, no tenderness of bilateral greater trochanters, Negative ZAFAR, negative SLRT bilaterally.  \par CV: RRR.\par Skin: No rash.\par Musculoskeletal: No joint deformities, no scoliosis, lordosis, kyphosis, pes cavus or hammer toes.\par Extremities: No peripheral edema.\par Neurological exam:\par Mental status: Patient is alert, attentive and oriented X3. Speech is coherent and fluent without dysarthria or aphasia. . Affect normal.\par CN: Pupils were 6 mm and reactive to light. Extraocular movements were full. Visual fields are intact to confrontation. No nystagmus. There was no face, jaw, palate or tongue weakness or atrophy. Facial sensation was normal and symmetric. Hearing was grossly intact. Shoulder shrug was normal.\par Motor exam revealed normal bulk and tone. There is no evidence of atrophy or fasciculations. There is no pronator drift. Manual muscle testing revealed 5/5 strength throughout including neck flexion/extension and proximal and distal muscles of the arms and legs.\par Sensory exam demonstrated was normal to touch, pin, proprioception, and vibration in arms and legs. Romberg was negative.\par DTRs: 2+ b/l biceps, 2+ triceps, 2 + brachioradialis, 2+ patellar, and 2+ ankle reflexes. Plantar responses were flexor bilaterally. No clonus, Hector's or crossed adductor response.\par Coord: Normal finger to nose testing and rapid alternating movements.\par Gait: Normal gait.\par

## 2023-04-12 NOTE — ASSESSMENT
[FreeTextEntry1] : 62 year F with multiple medical comorbidities including Anxiety, Depression, HTN, HLD, DM2, KUMAR, GERD with new onset constant daily headaches, worsening pre-existing anxiety and sleep difficulties.  Physical exam with tenderness over B/L greater occipital nerve, + cervical paraspinal muscle and trap tenderness/spasm. HA appear to be multifactorial including cervicogenic component , \par \par Extensively discussed the nature of Migraine headaches as well as importance of lifestyle modifications including sleep, diet, exercise, proper hydration, avoidance of caffeine, limiting alcohol intake as well as avoidance of triggers. We also discussed nonpharmacologic treatments including therapeutic massage, breathing exercises, muscle relaxation techniques, acupuncture, acupressure as well as CBT. \par \par -Imaging including MRI brain and C spine reviewed. \par - She was instructed to d/c the regular use of OTC NSAIDS including APAP/ibuprofen for now. \par -Medrol dose pack advised of AE \par -start PT \par -consider muscle relaxant v lose dose TCA v venlafaxine. \par -fu in 4-6 weeks \par \par The patient had the opportunity to ask questions and to provide feedback. All questions were answered accordingly.  The patient verbalized understanding of of the management plan.\par \par \par \par \par \par

## 2023-06-08 ENCOUNTER — APPOINTMENT (OUTPATIENT)
Dept: PAIN MANAGEMENT | Facility: CLINIC | Age: 63
End: 2023-06-08
Payer: MEDICAID

## 2023-06-08 VITALS
HEART RATE: 53 BPM | WEIGHT: 144 LBS | BODY MASS INDEX: 30.23 KG/M2 | HEIGHT: 58 IN | SYSTOLIC BLOOD PRESSURE: 100 MMHG | DIASTOLIC BLOOD PRESSURE: 66 MMHG

## 2023-06-08 PROCEDURE — 99214 OFFICE O/P EST MOD 30 MIN: CPT | Mod: 25

## 2023-06-08 PROCEDURE — 20553 NJX 1/MLT TRIGGER POINTS 3/>: CPT

## 2023-06-08 RX ORDER — DICLOFENAC SODIUM 75 MG/1
75 TABLET, DELAYED RELEASE ORAL
Qty: 30 | Refills: 1 | Status: ACTIVE | COMMUNITY
Start: 2023-06-08 | End: 1900-01-01

## 2023-06-08 NOTE — ASSESSMENT
[FreeTextEntry1] : 62 year F with multiple medical comorbidities including Anxiety, Depression, HTN, HLD, DM2, KUMAR, GERD with new onset constant daily headaches, worsening pre-existing anxiety and sleep difficulties.  Physical exam with   + cervical paraspinal muscle and trap tenderness/spasm. HA significantly improved with medrol dose pack and d/c OTC NSAIDS. \par \par TPI C paraspinals and traps performed today without AE \par -Imaging including MRI brain and C spine reviewed. \par -never started PT as recently changed insurance\par -start Diclofenac 75 mg 2x/day with meals x 2 weeks then prn, advised of AE \par -consider d/c gabapentin at some point \par -consider muscle relaxant v lose dose TCA v venlafaxine. \par -fu in 2-3 months for repeat TPI \par \par The patient had the opportunity to ask questions and to provide feedback. All questions were answered accordingly.  The patient verbalized understanding of of the management plan.\par \par \par \par \par \par

## 2023-06-08 NOTE — PHYSICAL EXAM
[FreeTextEntry1] : General appearance: Well nourished and with attention to grooming.No signs of distress. No signs of toxicity.\par Head/Neck/spine: Examination of the cervical spine reveals normal range of motion in the neck, no midline tenderness, + paraspinal muscle and traps tenderness with trigger points, no facet tenderness, negative Spurling's bilaterally. Examination of the lumbar spine reveals normal range of motion including flexion, extension and lateral rotation. No midline tenderness, no paraspinal muscle tenderness, negative facet loading,  no tenderness of sciatic notch, no tenderness of bilateral greater trochanters, Negative ZAFAR, negative SLRT bilaterally.  \par CV: RRR.\par Skin: No rash.\par Musculoskeletal: No joint deformities, no scoliosis, lordosis, kyphosis, pes cavus or hammer toes.\par Extremities: No peripheral edema.\par Neurological exam:\par Mental status: Patient is alert, attentive and oriented X3. Speech is coherent and fluent without dysarthria or aphasia. . Affect normal.\par CN: Pupils were 6 mm and reactive to light. Extraocular movements were full. Visual fields are intact to confrontation. No nystagmus. There was no face, jaw, palate or tongue weakness or atrophy. Facial sensation was normal and symmetric. Hearing was grossly intact. Shoulder shrug was normal.\par Motor exam revealed normal bulk and tone. There is no evidence of atrophy or fasciculations. There is no pronator drift. Manual muscle testing revealed 5/5 strength throughout including neck flexion/extension and proximal and distal muscles of the arms and legs.\par Sensory exam demonstrated was normal to touch, pin, proprioception, and vibration in arms and legs. Romberg was negative.\par DTRs: 2+ b/l biceps, 2+ triceps, 2 + brachioradialis, 2+ patellar, and 2+ ankle reflexes. Plantar responses were flexor bilaterally. No clonus, Hector's or crossed adductor response.\par Coord: Normal finger to nose testing and rapid alternating movements.\par Gait: Normal gait.\par

## 2023-06-08 NOTE — HISTORY OF PRESENT ILLNESS
[FreeTextEntry1] : 62 year old RH female with Pmhx Anxiety, Depression, HTN, HLD, DM2, GERD, hypothyroidism, chronic neck and lower back pain s/p recent ER visit with 4 month h/o intermittent chest pain, palpitations and SOB r/o for ACS who initially evaluated on April 2023 with headaches.  Since her last visit she has decreased the use of OTC NSAIDs and reports reduction in the frequency of her headaches . Medrol dose pack w/out help. \par \par She has only had 2 headaches which were not as severe , no associated sx. Typical migraines diffuse 10/10 associated with sensitivity to light , noise, nausea, no vomiting, dizziness, dizziness/vertigo.  Denies aura or visual changes. She has tried APAP.  Triggers include decreases sleep, stress. \par \par Neck pain L > R radiating down left proximal arm to elbow 8-10/10.  She did not start PT as she recently changed insurance. \par \par She has difficulty sleeping -disrupted sleep- KUMAR using CPAP. \par BAILEY has 1 servings of caffeine per day including green tea. \par BAILEY  is exercises 3 times per week including walking 30 minutes 3x/week. \par \par \par Prior meds include:\par Current meds include:  Synthroid 125 mcg daily, metformin ER  500 mg 2x/day, Oxybutinin 10 mg daily, gabapentin 100mg daily, Atorvastatin 10 mg daily, Lisinopril, Ozempic, B12, D3, colace, Dulcolax, \par \par MRI brain Feb 2023 showed multiple small round nonspecific WM foci T2/Flair prolongations c/w microvascular disease. \par MRI C spine Feb 2023 showed mild multilevel cervical spondylosis. \par \par Social hx:  She is  with 7 children and 12 grandchildren and lives in [place].  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  She denies etoh, tobacco, denies illicits drug. \par \par \par

## 2023-07-26 ENCOUNTER — APPOINTMENT (OUTPATIENT)
Dept: PAIN MANAGEMENT | Facility: CLINIC | Age: 63
End: 2023-07-26
Payer: MEDICAID

## 2023-07-26 VITALS
DIASTOLIC BLOOD PRESSURE: 67 MMHG | BODY MASS INDEX: 29.18 KG/M2 | WEIGHT: 139 LBS | HEIGHT: 58 IN | SYSTOLIC BLOOD PRESSURE: 111 MMHG | HEART RATE: 48 BPM

## 2023-07-26 DIAGNOSIS — R51.9 HEADACHE, UNSPECIFIED: ICD-10-CM

## 2023-07-26 DIAGNOSIS — M54.2 CERVICALGIA: ICD-10-CM

## 2023-07-26 PROCEDURE — 20552 NJX 1/MLT TRIGGER POINT 1/2: CPT

## 2023-07-26 PROCEDURE — 99214 OFFICE O/P EST MOD 30 MIN: CPT | Mod: 25

## 2023-07-26 NOTE — ASSESSMENT
[FreeTextEntry1] : 62 year F with multiple medical comorbidities including Anxiety, Depression, HTN, HLD, DM2, KUMAR, GERD with new onset constant daily headaches, worsening pre-existing anxiety and sleep difficulties.  Physical exam with   + cervical paraspinal muscle and trap tenderness/spasm. \par \par \par TPI C paraspinals and traps performed today without AE \par -never started PT as recently changed insurance\par -continue Diclofenac 75 mg 2x/day prn with meals x 2 weeks then prn, advised of AE \par -d/c gabapentin at some point \par -venlafaxine. 37.5 mg daily and increase to 75 mg daily in 2 weeks if tolerated well. \par -fu in 2-3 months for repeat TPI \par \par The patient had the opportunity to ask questions and to provide feedback. All questions were answered accordingly.  The patient verbalized understanding of of the management plan.\par \par \par \par \par \par

## 2023-07-26 NOTE — PROCEDURE
[FreeTextEntry1] : The procedure risks, hazards and alternatives were discussed with the patient and appropriate consent was obtained. The area over the myofascial spasm / pain was prepped with alcohol utilizing sterile technique. After isolating it between two palpating fingertips a 27 gauge 1.5” needle was placed in the center of the myofascial spasm and a negative aspiration was performed. A total of 5 mL of 1% Lidocaine mixed with Kenalog 40 mg was injected into 4 sites Cervical /traps . The patient tolerated procedure well without any apparent difficulties or complications. \par \par BAILEY MAXI was monitored in the office for 5 minutes after injections and tolerated procedure well without adverse events.\par

## 2023-07-26 NOTE — HISTORY OF PRESENT ILLNESS
[FreeTextEntry1] : 62 year old RH female with Pmhx Anxiety, Depression, HTN, HLD, DM2, GERD, hypothyroidism, chronic neck and lower back pain s/p recent ER visit with 4 month h/o intermittent chest pain, palpitations and SOB r/o for ACS who initially evaluated on April 2023 with headaches.  Trigger point injections on last visit were helpful and now has neck pain 1-2x/week on average. + HA 2-3x/week top of head 8/10 lasting up to 3 hours, associated with sensitivity to light , no sensitivity to noise, nausea, no vomiting, dizziness, dizziness/vertigo.  Denies aura or visual changes. She has tried APAP. Taking APAP 2-3x/week.   \par \par  Triggers include decreases sleep, stress. \par \par Neck pain L > R radiating down left proximal arm to elbow 8-10/10.  She did not start PT as she recently changed insurance. \par \par She has difficulty sleeping -disrupted sleep- KUMAR using CPAP. \par BAILEY has 1 servings of caffeine per day including green tea. \par BAILEY  is exercises 3 times per week including walking 30 minutes 3x/week. \par \par \par Prior meds include:\par Current meds include:  Synthroid 125 mcg daily, metformin ER  500 mg 2x/day, Oxybutinin 10 mg daily, gabapentin 100mg daily, Atorvastatin 10 mg daily, Lisinopril, Ozempic, B12, D3, colace, Dulcolax, \par \par MRI brain Feb 2023 showed multiple small round nonspecific WM foci T2/Flair prolongations c/w microvascular disease. \par MRI C spine Feb 2023 showed mild multilevel cervical spondylosis. \par \par Social hx:  She is  with 7 children and 12 grandchildren and lives in [place].  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  She denies etoh, tobacco, denies illicits drug. \par \par \par

## 2023-09-08 ENCOUNTER — EMERGENCY (EMERGENCY)
Facility: HOSPITAL | Age: 63
LOS: 1 days | Discharge: HOME | End: 2023-09-08
Attending: EMERGENCY MEDICINE | Admitting: EMERGENCY MEDICINE
Payer: MEDICAID

## 2023-09-08 VITALS
SYSTOLIC BLOOD PRESSURE: 98 MMHG | RESPIRATION RATE: 18 BRPM | DIASTOLIC BLOOD PRESSURE: 70 MMHG | HEART RATE: 53 BPM | TEMPERATURE: 98 F | OXYGEN SATURATION: 98 %

## 2023-09-08 VITALS
TEMPERATURE: 98 F | DIASTOLIC BLOOD PRESSURE: 79 MMHG | HEART RATE: 53 BPM | RESPIRATION RATE: 16 BRPM | OXYGEN SATURATION: 100 % | SYSTOLIC BLOOD PRESSURE: 138 MMHG | HEIGHT: 60 IN | WEIGHT: 128.97 LBS

## 2023-09-08 LAB
ALBUMIN SERPL ELPH-MCNC: 3.7 G/DL — SIGNIFICANT CHANGE UP (ref 3.3–5)
ALP SERPL-CCNC: 69 U/L — SIGNIFICANT CHANGE UP (ref 40–120)
ALT FLD-CCNC: 35 U/L — SIGNIFICANT CHANGE UP (ref 12–78)
ANION GAP SERPL CALC-SCNC: 8 MMOL/L — SIGNIFICANT CHANGE UP (ref 5–17)
APPEARANCE UR: CLEAR — SIGNIFICANT CHANGE UP
AST SERPL-CCNC: 24 U/L — SIGNIFICANT CHANGE UP (ref 15–37)
BASOPHILS # BLD AUTO: 0.03 K/UL — SIGNIFICANT CHANGE UP (ref 0–0.2)
BASOPHILS NFR BLD AUTO: 0.3 % — SIGNIFICANT CHANGE UP (ref 0–2)
BILIRUB SERPL-MCNC: 0.6 MG/DL — SIGNIFICANT CHANGE UP (ref 0.2–1.2)
BILIRUB UR-MCNC: NEGATIVE — SIGNIFICANT CHANGE UP
BUN SERPL-MCNC: 16 MG/DL — SIGNIFICANT CHANGE UP (ref 7–23)
CALCIUM SERPL-MCNC: 9.6 MG/DL — SIGNIFICANT CHANGE UP (ref 8.5–10.1)
CHLORIDE SERPL-SCNC: 110 MMOL/L — HIGH (ref 96–108)
CO2 SERPL-SCNC: 23 MMOL/L — SIGNIFICANT CHANGE UP (ref 22–31)
COLOR SPEC: YELLOW — SIGNIFICANT CHANGE UP
CREAT SERPL-MCNC: 0.71 MG/DL — SIGNIFICANT CHANGE UP (ref 0.5–1.3)
DIFF PNL FLD: NEGATIVE — SIGNIFICANT CHANGE UP
EGFR: 96 ML/MIN/1.73M2 — SIGNIFICANT CHANGE UP
EOSINOPHIL # BLD AUTO: 0.08 K/UL — SIGNIFICANT CHANGE UP (ref 0–0.5)
EOSINOPHIL NFR BLD AUTO: 0.9 % — SIGNIFICANT CHANGE UP (ref 0–6)
GLUCOSE SERPL-MCNC: 98 MG/DL — SIGNIFICANT CHANGE UP (ref 70–99)
GLUCOSE UR QL: NEGATIVE MG/DL — SIGNIFICANT CHANGE UP
HCT VFR BLD CALC: 40.2 % — SIGNIFICANT CHANGE UP (ref 34.5–45)
HGB BLD-MCNC: 13 G/DL — SIGNIFICANT CHANGE UP (ref 11.5–15.5)
IMM GRANULOCYTES NFR BLD AUTO: 0.3 % — SIGNIFICANT CHANGE UP (ref 0–0.9)
KETONES UR-MCNC: NEGATIVE MG/DL — SIGNIFICANT CHANGE UP
LACTATE SERPL-SCNC: 1.1 MMOL/L — SIGNIFICANT CHANGE UP (ref 0.7–2)
LEUKOCYTE ESTERASE UR-ACNC: NEGATIVE — SIGNIFICANT CHANGE UP
LIDOCAIN IGE QN: 58 U/L — SIGNIFICANT CHANGE UP (ref 13–75)
LYMPHOCYTES # BLD AUTO: 2.29 K/UL — SIGNIFICANT CHANGE UP (ref 1–3.3)
LYMPHOCYTES # BLD AUTO: 26 % — SIGNIFICANT CHANGE UP (ref 13–44)
MCHC RBC-ENTMCNC: 29.6 PG — SIGNIFICANT CHANGE UP (ref 27–34)
MCHC RBC-ENTMCNC: 32.3 GM/DL — SIGNIFICANT CHANGE UP (ref 32–36)
MCV RBC AUTO: 91.6 FL — SIGNIFICANT CHANGE UP (ref 80–100)
MONOCYTES # BLD AUTO: 0.53 K/UL — SIGNIFICANT CHANGE UP (ref 0–0.9)
MONOCYTES NFR BLD AUTO: 6 % — SIGNIFICANT CHANGE UP (ref 2–14)
NEUTROPHILS # BLD AUTO: 5.85 K/UL — SIGNIFICANT CHANGE UP (ref 1.8–7.4)
NEUTROPHILS NFR BLD AUTO: 66.5 % — SIGNIFICANT CHANGE UP (ref 43–77)
NITRITE UR-MCNC: NEGATIVE — SIGNIFICANT CHANGE UP
NRBC # BLD: 0 /100 WBCS — SIGNIFICANT CHANGE UP (ref 0–0)
PH UR: 5 — SIGNIFICANT CHANGE UP (ref 5–8)
PLATELET # BLD AUTO: 331 K/UL — SIGNIFICANT CHANGE UP (ref 150–400)
POTASSIUM SERPL-MCNC: 4.2 MMOL/L — SIGNIFICANT CHANGE UP (ref 3.5–5.3)
POTASSIUM SERPL-SCNC: 4.2 MMOL/L — SIGNIFICANT CHANGE UP (ref 3.5–5.3)
PROT SERPL-MCNC: 7.6 G/DL — SIGNIFICANT CHANGE UP (ref 6–8.3)
PROT UR-MCNC: NEGATIVE MG/DL — SIGNIFICANT CHANGE UP
RBC # BLD: 4.39 M/UL — SIGNIFICANT CHANGE UP (ref 3.8–5.2)
RBC # FLD: 12.4 % — SIGNIFICANT CHANGE UP (ref 10.3–14.5)
SODIUM SERPL-SCNC: 141 MMOL/L — SIGNIFICANT CHANGE UP (ref 135–145)
SP GR SPEC: 1.03 — HIGH (ref 1–1.03)
UROBILINOGEN FLD QL: 0.2 MG/DL — SIGNIFICANT CHANGE UP (ref 0.2–1)
WBC # BLD: 8.81 K/UL — SIGNIFICANT CHANGE UP (ref 3.8–10.5)
WBC # FLD AUTO: 8.81 K/UL — SIGNIFICANT CHANGE UP (ref 3.8–10.5)

## 2023-09-08 PROCEDURE — 99285 EMERGENCY DEPT VISIT HI MDM: CPT

## 2023-09-08 PROCEDURE — 36415 COLL VENOUS BLD VENIPUNCTURE: CPT

## 2023-09-08 PROCEDURE — 96374 THER/PROPH/DIAG INJ IV PUSH: CPT | Mod: XU

## 2023-09-08 PROCEDURE — 74177 CT ABD & PELVIS W/CONTRAST: CPT | Mod: MA

## 2023-09-08 PROCEDURE — 99284 EMERGENCY DEPT VISIT MOD MDM: CPT | Mod: 25

## 2023-09-08 PROCEDURE — 83690 ASSAY OF LIPASE: CPT

## 2023-09-08 PROCEDURE — 80053 COMPREHEN METABOLIC PANEL: CPT

## 2023-09-08 PROCEDURE — 85025 COMPLETE CBC W/AUTO DIFF WBC: CPT

## 2023-09-08 PROCEDURE — 83605 ASSAY OF LACTIC ACID: CPT

## 2023-09-08 PROCEDURE — 74177 CT ABD & PELVIS W/CONTRAST: CPT | Mod: 26,MA

## 2023-09-08 PROCEDURE — 81003 URINALYSIS AUTO W/O SCOPE: CPT

## 2023-09-08 RX ORDER — ONDANSETRON 8 MG/1
4 TABLET, FILM COATED ORAL ONCE
Refills: 0 | Status: COMPLETED | OUTPATIENT
Start: 2023-09-08 | End: 2023-09-08

## 2023-09-08 RX ORDER — SODIUM CHLORIDE 9 MG/ML
1000 INJECTION INTRAMUSCULAR; INTRAVENOUS; SUBCUTANEOUS
Refills: 0 | Status: COMPLETED | OUTPATIENT
Start: 2023-09-08 | End: 2023-09-08

## 2023-09-08 RX ORDER — LACTOBACILLUS ACIDOPHILUS 100MM CELL
2 CAPSULE ORAL
Qty: 40 | Refills: 0
Start: 2023-09-08 | End: 2023-09-17

## 2023-09-08 RX ADMIN — ONDANSETRON 4 MILLIGRAM(S): 8 TABLET, FILM COATED ORAL at 09:11

## 2023-09-08 RX ADMIN — SODIUM CHLORIDE 1000 MILLILITER(S): 9 INJECTION INTRAMUSCULAR; INTRAVENOUS; SUBCUTANEOUS at 09:53

## 2023-09-08 RX ADMIN — SODIUM CHLORIDE 1000 MILLILITER(S): 9 INJECTION INTRAMUSCULAR; INTRAVENOUS; SUBCUTANEOUS at 09:11

## 2023-09-08 RX ADMIN — Medication 1 TABLET(S): at 12:27

## 2023-09-08 NOTE — ED PROVIDER NOTE - PROGRESS NOTE DETAILS
Reevaluated patient at bedside.  Patient feeling much improved.  Abdomen is now soft, non-tender. Discussed the results of all diagnostic testing in ED.  An opportunity to ask questions was given.  Discussed the nature of diagnostic testing in the abdomen and the importance of continued reevaluation.  Understanding of the potential risk of occult pathology was verbalized and the patient will continue to follow-up as an outpatient for further workup and evaluation until resolution.  Discussed the importance of prompt, close medical follow-up.  Patient will return with any changes, concerns or persistent / worsening symptoms. Discussed with patient and family at length, they demonstrate understanding of all instructions.

## 2023-09-08 NOTE — ED ADULT NURSE NOTE - CHIEF COMPLAINT QUOTE
Patient walked in with c/o abdominal pain, nausea, vomiting, diarrhea since Monday after possibly eating undercooked chicken at [employer] on Sunday. Patient went to urgent care on Wednesday, reports they gave her zofran, omeprazole, famotidine, and loperamide, patient reports now she feels constipated and is more nauseous. Patient also had RUQ ultrasound performed which showed gall bladder polyps and possible fatty liver disease.  PMHx: DM, HTN, hypothyroid

## 2023-09-08 NOTE — ED PROVIDER NOTE - GASTROINTESTINAL, MLM
Abdomen soft, pos diffuse periumbilical and epigastric tenderness, no guarding.  non-distended. no hsm. no cvat

## 2023-09-08 NOTE — ED ADULT NURSE NOTE - OBJECTIVE STATEMENT
Pt received in bed alert and oriented and resting in bed with the c/o abdominal pain, nausea, vomiting, diarrhea since Monday. Pt states that she possibly ate  undercooked chicken at a [employer] on Sunday. Pt was seen at  and RX Zofran, Omeprazole, Pepcid, and loperamide, patient reports now she feels constipated and is more nauseous. As per Md's orders Iv landen placed blood specimen obtained and sent to the lab. Pt stable and nursing care ongoing and safety maintained.

## 2023-09-08 NOTE — ED PROVIDER NOTE - CARE PROVIDER_API CALL
[facility]  [address] [place] [address]  Phone: [phone]  Fax: [phone]  Follow Up Time:     Eduardo Acevedo  Gastroenterology  [address] [place] [address], [address]  [place] [address]  Phone: [phone]  Fax: [phone]  Follow Up Time:

## 2023-09-08 NOTE — ED PROVIDER NOTE - CLINICAL SUMMARY MEDICAL DECISION MAKING FREE TEXT BOX
Vomiting and diarrhea with acute abdominal tenderness and pain over past 5 days.  Will check labs, CT, IV fluids, Zofran, reeval

## 2023-09-08 NOTE — ED PROVIDER NOTE - OBJECTIVE STATEMENT
62-year-old female with a history of type 2 diabetes, hypertension, hypothyroid presents with has been having abdominal cramping and discomfort to the mid and upper abdomen, vomiting and diarrhea over past 5 days.  Patient with less diarrhea now, however has a vomit this morning.  Patient was seen by urgent care this week, was given meds and loperamide.  Patient with persistent discomfort today.  Patient had an outpatient ultrasound which was negative.  Patient states this started after being at a [employer], no other known sick contacts at the [employer].  No neck or back pain.  Patient with a mild cough.  No known COVID exposure.  Patient had a negative COVID test at the urgent care.  No other acute injury or complaints.

## 2023-09-08 NOTE — ED PROVIDER NOTE - NSFOLLOWUPINSTRUCTIONS_ED_ALL_ED_FT
1. Follow-up with your Primary Medical doctor. Call today for prompt follow-up.  2. Return to Emergency room for any worsening or persistent pain, weakness, fever, vomiting, diarrhea, unable to eat / drink, weak or dizzy, or any other concerning symptoms.  3. See attached instruction sheets for additional information, including information regarding signs and symptoms to look out for, reasons to seek immediate care and other important instructions.  4. Follow-up with gastroenterology, call today to arrange prompt follow-up  5.  Augmentin twice daily for 10 days  6.  Lactobacillus twice daily for 10 days    1. Seguimiento con avelar médico de cabecera. Llame hoy para un seguimiento inmediato.  2. Regrese a la iam de emergencias si empeora o persiste el dolor, debilidad, fiebre, vómitos, diarrea, incapacidad para comer o beber, debilidad o mareos, o cualquier otro síntoma preocupante.  3. Consulte las hojas de instrucciones adjuntas para obtener información adicional, incluida información sobre los signos y síntomas a los que debe prestar atención, motivos para buscar atención inmediata y otras instrucciones importantes.  4. Seguimiento con gastroenterología, llame homeagan para concertar un seguimiento inmediato  5. Augmentin dos veces al día yen 10 días  6. Lactobacillus dos veces al día yen 10 días

## 2023-09-08 NOTE — ED PROVIDER NOTE - PATIENT PORTAL LINK FT
You can access the FollowMyHealth Patient Portal offered by [facility] by registering at the following website: [email or web address]. By joining [employer]’s FollowMyHealth portal, you will also be able to view your health information using other applications (apps) compatible with our system.

## 2023-09-08 NOTE — ED ADULT NURSE NOTE - NSFALLRISKINTERV_ED_ALL_ED

## 2023-09-08 NOTE — ED PROVIDER NOTE - CARE PROVIDERS DIRECT ADDRESSES
[email or web address]

## 2023-09-08 NOTE — ED ADULT TRIAGE NOTE - CHIEF COMPLAINT QUOTE
Patient walked in with c/o abdominal pain, nausea, vomiting, diarrhea since Monday after possibly eating undercooked chicken at [facility] on Sunday. Patient went to urgent care on Wednesday, reports they gave her zofran, omeprazole, famotidine, and loperamide, patient reports now she feels constipated and is more nauseous. Patient also had RUQ ultrasound performed which showed gall bladder polyps and possible fatty liver disease.  PMHx: DM, HTN, hypothyroid

## 2023-09-08 NOTE — ED PROVIDER NOTE - DIFFERENTIAL DIAGNOSIS
Rule out acute abdominal infection, colitis, enteritis, electrolyte abnormality, leukocytosis, other acute pathology Differential Diagnosis

## 2023-10-03 ENCOUNTER — APPOINTMENT (OUTPATIENT)
Dept: PAIN MANAGEMENT | Facility: CLINIC | Age: 63
End: 2023-10-03
Payer: MEDICAID

## 2023-10-03 PROCEDURE — 20553 NJX 1/MLT TRIGGER POINTS 3/>: CPT

## 2023-10-03 PROCEDURE — 99214 OFFICE O/P EST MOD 30 MIN: CPT | Mod: 25

## 2023-10-03 RX ORDER — METHYLPREDNISOLONE 4 MG/1
4 TABLET ORAL
Qty: 1 | Refills: 0 | Status: DISCONTINUED | COMMUNITY
Start: 2023-04-12 | End: 2023-10-03

## 2023-10-03 NOTE — ED PROVIDER NOTE - OBJECTIVE STATEMENT
ED primary care team Dr. Bird 62-year-old female with hypertension, diabetes, hyperlipidemia, hypothyroidism, complaining of headache with numbness and tingling to both sides of the face mostly on the lips, sometimes on hands and feet, on and off for 4 months.  Patient states that she would have a headache first then followed by all of these numbness and tingling sensation in face/hands/legs.  In the last 2 weeks patient was also having generalized weakness, and lightheadedness.  Patient has to walk with assistance.  Denies chest pain, nausea vomiting, fever, dizziness, chest pain, sob.   pt was at [facility] last week but all workup were normal. however pt did not remember clearly what studies she got.

## 2024-02-06 ENCOUNTER — APPOINTMENT (OUTPATIENT)
Dept: PAIN MANAGEMENT | Facility: CLINIC | Age: 64
End: 2024-02-06
Payer: MEDICAID

## 2024-02-06 VITALS
DIASTOLIC BLOOD PRESSURE: 82 MMHG | HEIGHT: 58 IN | SYSTOLIC BLOOD PRESSURE: 145 MMHG | WEIGHT: 139 LBS | BODY MASS INDEX: 29.18 KG/M2 | HEART RATE: 51 BPM

## 2024-02-06 PROCEDURE — 20553 NJX 1/MLT TRIGGER POINTS 3/>: CPT

## 2024-02-06 PROCEDURE — 99214 OFFICE O/P EST MOD 30 MIN: CPT | Mod: 25

## 2024-02-06 NOTE — ASSESSMENT
[FreeTextEntry1] : 63  year F with multiple medical comorbidities including Anxiety, Depression, HTN, HLD, DM2, KUMAR, GERD with new onset constant daily headaches, worsening pre-existing anxiety and sleep difficulties.  Physical exam with   + cervical paraspinal muscle and trap tenderness/spasm.   TPI traps performed today without AE  -never started PT as recently changed insurance and will do so.  -venlafaxine 75 mg daily  -cstart yclobenzaprine 5 mg 1-2x/day prn.  -fu in 2-3 months for repeat TPI   The patient had the opportunity to ask questions and to provide feedback. All questions were answered accordingly.  The patient verbalized understanding  of the management plan.

## 2024-02-06 NOTE — PROCEDURE
[FreeTextEntry1] : The procedure risks, hazards and alternatives were discussed with the patient and appropriate consent was obtained. The area over the myofascial spasm / pain was prepped with alcohol utilizing sterile technique. After isolating it between two palpating fingertips a 27 gauge 1.5" needle was placed in the center of the myofascial spasm and a negative aspiration was performed. A total of 5 mL of 1% Lidocaine mixed with Kenalog 40 mg was injected into 6 sites . The patient tolerated procedure well without any apparent difficulties or complications.   BAILEY CEDRIC was monitored in the office for 5 minutes after injections and tolerated procedure well without adverse events.

## 2024-02-12 NOTE — ED CDU PROVIDER DISPOSITION NOTE - ATTENDING CONTRIBUTION TO CARE
I have evaluated the patient and agree with the documentation and assessment as made by the PA. We have discussed plan of care and work up for the patient.    Exam:    GEN:  Non-toxic appearing, non-distressed, speaking full sentences, non-diaphoretic, AAOx3  HEENT:  NCAT, neck supple, EOMI, PERRLA, sclera anicteric, no conjunctival pallor or injection, no stridor, normal voice, no tonsillar exudate, uvula midline  CV:  regular rhythm and rate, s1/s2 audible, no murmurs, rubs or gallops, peripheral pulses 2+ and symmetric  PULM:  non-labored respirations, lungs clear to auscultation bilaterally, no wheezes, crackles or rales  ABD:  non distended, non-tender, no rebound, no guarding, negative Holliday's sign, bowel sounds normal, no cvat  MSK:  no gross deformity, non-tender extremities and joints, range of motion grossly normal appearing, no extremity edema, extremities warm and well perfused   NEURO:  AAOx3, CN II-XII intact, motor 5/5 in upper and lower extremities bilaterally, sensation grossly intact in extremities and trunk, finger to nose testing wnl, no nystagmus, negative Romberg, no pronator drift, no gait deficit  SKIN:  warm, dry, no rash or vesicles shortness of breath

## 2024-04-15 ENCOUNTER — APPOINTMENT (OUTPATIENT)
Dept: PAIN MANAGEMENT | Facility: CLINIC | Age: 64
End: 2024-04-15
Payer: MEDICAID

## 2024-04-15 VITALS
SYSTOLIC BLOOD PRESSURE: 129 MMHG | BODY MASS INDEX: 28.97 KG/M2 | HEIGHT: 58 IN | HEART RATE: 56 BPM | DIASTOLIC BLOOD PRESSURE: 86 MMHG | WEIGHT: 138 LBS

## 2024-04-15 PROCEDURE — 99214 OFFICE O/P EST MOD 30 MIN: CPT | Mod: 25

## 2024-04-15 PROCEDURE — 20553 NJX 1/MLT TRIGGER POINTS 3/>: CPT

## 2024-04-15 RX ORDER — VENLAFAXINE HYDROCHLORIDE 75 MG/1
75 CAPSULE, EXTENDED RELEASE ORAL
Qty: 30 | Refills: 3 | Status: DISCONTINUED | COMMUNITY
Start: 2023-07-26 | End: 2024-04-15

## 2024-04-15 RX ORDER — TIZANIDINE HYDROCHLORIDE 2 MG/1
2 CAPSULE ORAL
Qty: 60 | Refills: 3 | Status: ACTIVE | COMMUNITY
Start: 2024-02-06 | End: 1900-01-01

## 2024-04-15 NOTE — HISTORY OF PRESENT ILLNESS
[FreeTextEntry1] : 63  year old RH female with Pmhx Anxiety, Depression, HTN, HLD, DM2, GERD, hypothyroidism, chronic neck and lower back pain s/p recent ER visit with 4 month h/o intermittent chest pain, palpitations and SOB r/o for ACS who initially evaluated on April 2023 with headaches.  Patient reports no new medical issues. She is responding well to TPI with decrease myofascial neck pain, increased ROM and decrease in HA overall.  HA 1-2x/week top of head 8/10 lasting up to 3 hours, associated with sensitivity to light , no sensitivity to noise, nausea, no vomiting, dizziness, dizziness/vertigo.  Denies aura or visual changes.  Triggers include decreases sleep, stress.   Neck pain L > R radiating down left proximal arm to elbow 6-810/10, no longer radicular.  She did not start PT. Tolerating Tizanidine 2mg q hs well and without AE.    She has difficulty sleeping -disrupted sleep- KUMAR using CPAP. Somewhat improved with Tizanidine.   Prior meds include: gabapentin 100mg daily,Venlafaxine 75 mg daily,  Current meds include: Tizanidine 2 mg q hsSynthroid 125 mcg daily, metformin ER  500 mg 2x/day, Oxybutinin 10 mg daily, Atorvastatin 10 mg daily, Lisinopril, Ozempic, B12, D3, colace, Dulcolax,   MRI brain Feb 2023 showed multiple small round nonspecific WM foci T2/Flair prolongations c/w microvascular disease.  MRI C spine Feb 2023 showed mild multilevel cervical spondylosis.   Social hx:  She is  with 7 children and 12 grandchildren and lives in [place].  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  She denies etoh, tobacco, denies illicits drug.

## 2024-04-15 NOTE — ASSESSMENT
[FreeTextEntry1] : 63  year F with multiple medical comorbidities including Anxiety, Depression, HTN, HLD, DM2, KUMAR, GERD with new onset constant daily headaches, improving, myofascial neck pain,   Physical exam with   + cervical paraspinal muscle and trap tenderness/spasm.   TPI traps performed today without AE  -never started PT as recently changed insurance and will do so.  -/c venlafaxine 75 mg daily  -continue Tizanidine 2mg qhs prn .  -fu in 3-4 months for repeat TPI   The patient had the opportunity to ask questions and to provide feedback. All questions were answered accordingly.  The patient verbalized understanding  of the management plan.

## 2024-07-30 ENCOUNTER — NON-APPOINTMENT (OUTPATIENT)
Age: 64
End: 2024-07-30

## 2024-07-31 ENCOUNTER — APPOINTMENT (OUTPATIENT)
Dept: PAIN MANAGEMENT | Facility: CLINIC | Age: 64
End: 2024-07-31
Payer: MEDICAID

## 2024-07-31 VITALS
SYSTOLIC BLOOD PRESSURE: 118 MMHG | WEIGHT: 138 LBS | HEART RATE: 52 BPM | HEIGHT: 58 IN | DIASTOLIC BLOOD PRESSURE: 70 MMHG | BODY MASS INDEX: 28.97 KG/M2

## 2024-07-31 PROCEDURE — 20553 NJX 1/MLT TRIGGER POINTS 3/>: CPT

## 2024-07-31 PROCEDURE — 99214 OFFICE O/P EST MOD 30 MIN: CPT | Mod: 25

## 2024-07-31 NOTE — ASSESSMENT
[FreeTextEntry1] : 63  year F with multiple medical comorbidities including Anxiety, Depression, HTN, HLD, DM2, KUMAR, GERD with new onset constant daily headaches, improving, myofascial neck pain,   Physical exam with   + cervical paraspinal muscle and trap tenderness/spasm.   TPI traps/c paraspinals performed today without AE  -never started PT as recently changed insurance and will do so.  -continue Tizanidine 2mg qhs prn .  -fu in 3-4 months for repeat TPI   The patient had the opportunity to ask questions and to provide feedback. All questions were answered accordingly.  The patient verbalized understanding  of the management plan.

## 2024-07-31 NOTE — HISTORY OF PRESENT ILLNESS
[FreeTextEntry1] : 63  year old RH female with Pmhx Anxiety, Depression, HTN, HLD, DM2, GERD, hypothyroidism, chronic neck and lower back pain s/p recent ER visit with 4 month h/o intermittent chest pain, palpitations and SOB r/o for ACS who initially evaluated on April 2023 with headaches.  Patient reports no new medical issues. Overall she is feeling well with significant decrease in the intensity and frequency of her pain.  Still with occasional neck stiffness/tightness > left as well as occasional headaches but feeling well overall.  Currently with left trap and neck pain limited ROM associated with spasm. Neck pain L > R radiating down left proximal arm to elbow 6-810/10, no longer radicular.    She is responding well to TPI with decrease myofascial neck pain, increased ROM and decrease in HA overall.  HA 1-2x/week top of head 8/10 lasting up to 3 hours, associated with sensitivity to light , no sensitivity to noise, nausea, no vomiting, dizziness, dizziness/vertigo.  Denies aura or visual changes.  Triggers include decreases sleep, stress.   She has difficulty sleeping -disrupted sleep- KUMAR using CPAP. Somewhat improved with Tizanidine.   Prior meds include: gabapentin 100mg daily,Venlafaxine 75 mg daily,  Current meds include: Tizanidine 2 mg q hsSynthroid 125 mcg daily, metformin ER  500 mg 2x/day, Oxybutinin 10 mg daily, Atorvastatin 10 mg daily, Lisinopril, Ozempic, B12, D3, colace, Dulcolax,   MRI brain Feb 2023 showed multiple small round nonspecific WM foci T2/Flair prolongations c/w microvascular disease.  MRI C spine Feb 2023 showed mild multilevel cervical spondylosis.   Social hx:  She is  with 7 children and 12 grandchildren and lives in [place].  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  She denies etoh, tobacco, denies illicits drug.

## 2024-07-31 NOTE — PROCEDURE
[FreeTextEntry1] : The procedure risks, hazards and alternatives were discussed with the patient and appropriate consent was obtained. The area over the myofascial spasm / pain was prepped with alcohol utilizing sterile technique. After isolating it between two palpating fingertips a 27 gauge 1.5" needle was placed in the center of the myofascial spasm and a negative aspiration was performed. A total of 5 mL of 1% Lidocaine mixed with Ketorolac 30mg was injected into 4 sites . The patient tolerated procedure well without any apparent difficulties or complications.   BAILEY STEELE was monitored in the office for 5 minutes after injections and tolerated procedure well without adverse events.  Ketorolac [id number] 9/2024 Lidocaine [id number], exp 07/2026

## 2024-08-13 NOTE — ED ADULT NURSE NOTE - NSIMPLEMENTINTERV_GEN_ALL_ED
Alert-The patient is alert, awake and responds to voice. The patient is oriented to time, place, and person. The triage nurse is able to obtain subjective information.
Implemented All Fall Risk Interventions:  [place] to call system. Call bell, personal items and telephone within reach. Instruct patient to call for assistance. Room bathroom lighting operational. Non-slip footwear when patient is off stretcher. Physically safe environment: no spills, clutter or unnecessary equipment. Stretcher in lowest position, wheels locked, appropriate side rails in place. Provide visual cue, wrist band, yellow gown, etc. Monitor gait and stability. Monitor for mental status changes and reorient to person, place, and time. Review medications for side effects contributing to fall risk. Reinforce activity limits and safety measures with patient and family.

## 2024-11-05 ENCOUNTER — APPOINTMENT (OUTPATIENT)
Dept: PAIN MANAGEMENT | Facility: CLINIC | Age: 64
End: 2024-11-05
Payer: MEDICAID

## 2024-11-05 VITALS
WEIGHT: 140 LBS | BODY MASS INDEX: 29.39 KG/M2 | SYSTOLIC BLOOD PRESSURE: 94 MMHG | HEIGHT: 58 IN | HEART RATE: 52 BPM | DIASTOLIC BLOOD PRESSURE: 60 MMHG

## 2024-11-05 PROCEDURE — 20553 NJX 1/MLT TRIGGER POINTS 3/>: CPT

## 2024-11-05 PROCEDURE — 99214 OFFICE O/P EST MOD 30 MIN: CPT | Mod: 25

## 2025-02-07 ENCOUNTER — APPOINTMENT (OUTPATIENT)
Dept: PAIN MANAGEMENT | Facility: CLINIC | Age: 65
End: 2025-02-07